# Patient Record
Sex: FEMALE | Race: WHITE | NOT HISPANIC OR LATINO | Employment: FULL TIME | URBAN - METROPOLITAN AREA
[De-identification: names, ages, dates, MRNs, and addresses within clinical notes are randomized per-mention and may not be internally consistent; named-entity substitution may affect disease eponyms.]

---

## 2017-08-07 ENCOUNTER — ALLSCRIPTS OFFICE VISIT (OUTPATIENT)
Dept: OTHER | Facility: OTHER | Age: 22
End: 2017-08-07

## 2017-08-07 DIAGNOSIS — J01.90 ACUTE SINUSITIS: ICD-10-CM

## 2017-08-07 DIAGNOSIS — Z13.220 ENCOUNTER FOR SCREENING FOR LIPOID DISORDERS: ICD-10-CM

## 2017-08-07 DIAGNOSIS — E66.9 OBESITY: ICD-10-CM

## 2017-11-14 ENCOUNTER — ALLSCRIPTS OFFICE VISIT (OUTPATIENT)
Dept: OTHER | Facility: OTHER | Age: 22
End: 2017-11-14

## 2017-11-15 NOTE — PROGRESS NOTES
Assessment    1  Hemorrhoids (455 6) (K64 9)  2  Depression screening (V79 0) (Z13 89)  3  Obese (278 00) (E66 9)    Plan  Depression screening    · *VB - PHQ-9 Tool; Status:Complete;   Done: 76KIQ5895 12:00AM  Hemorrhoids    · Start: Metamucil Smooth Texture 28 3 % Oral Powder; 1 tbsp  in juice  1-2 x a day   · Start: Preparation H 0 25-14-74 9 % Rectal Ointment; as directed  Obese    · Diets that are low in carbohydrates and high in protein are very popular for weight loss  ;Status:Complete;   Done: 43VGV8283 10:58AM   · Keep a diary of when and what you eat ; Status:Complete;   Done: 94CHG3507 10:58AM   · Some eating tips that can help you lose weight ; Status:Complete;   Done: 04Plu534706:58AM   · We encourage all of our patients to exercise regularly  30 minutes of exercise or physicalactivity five or more days a week is recommended for children and adults  ;Status:Complete;   Done: 03UHJ0047 10:58AM   · We recommend that you bring your body mass index down to 26 ; Status:Complete;  Done: 93QBI1046 10:58AM    Discussion/Summary    RTO if continues or worse  The patient was counseled regarding instructions for management,-- risk factor reductions,-- prognosis,-- patient and family education,-- impressions,-- risks and benefits of treatment options,-- importance of compliance with treatment  Possible side effects of new medications were reviewed with the patient/guardian today  Chief Complaint  Pt exp blood in stool x 1 wk      History of Present Illness  HPI: pt has had intermittent blood in the bowl  NO pain and BM noremal   Hemorrhoid (Brief): The patient is being seen for an initial evaluation of hemorrhoids  Symptoms:  rectal bleeding, but-- no anal itching,-- no anal pain,-- no pain with defecation,-- no anal lump,-- no anal leakage,-- no constipation-- and-- no diarrhea  The patient is not currently being treated for this problem   Risk factors:  no current pregnancy,-- no recent pregnancy,-- no chronic constipation-- and-- no chronic diarrhea  Family history:  no colorectal cancer,-- no ulcerative colitis-- and-- no Crohn's disease  This problem has not been previously evaluated  Obesity (Follow-Up): The patient is being seen for follow-up of obesity  The patient reports doing poorly and a weight gain of 55lbs/ 4 years pounds  Interval symptoms:  worsened poor eating habits-- and-- stable depressed mood  The patient is not currently on medication for this problem  Disease management:  the patient is not doing well with her goals  Diet plan: low calorie diet, limited portion sizes, limited junk food and limited high sugar drinks  Due for: diabetes screening and lipid profile  Review of Systems   Constitutional: as noted in HPI  Gastrointestinal: no complaints of abdominal pain, no constipation, no nausea or diarrhea, no vomiting, no bloody stools-- and-- as noted in HPI  Musculoskeletal: no complaints of arthralgia, no myalgia, no joint swelling or stiffness, no limb pain or swelling  Integumentary: no complaints of skin rash or lesion, no itching or dry skin, no skin wounds  Active Problems  1  Birth control (V25 9) (Z30 9)  2  Obese (278 00) (E66 9)  3  Screening for lipoid disorders (V77 91) (Z13 220)    Past Medical History  Active Problems And Past Medical History Reviewed: The active problems and past medical history were reviewed and updated today  Social History     · Never A Smoker   · Never Drank Alcohol  The social history was reviewed and updated today  The social history was reviewed and is unchanged  Current Meds  1  Apri 0 15-30 MG-MCG Oral Tablet; Therapy: [id number] to Recorded    The medication list was reviewed and updated today  Allergies  1   Sulfa Drugs    Vitals   Recorded: 33EYP2558 10:32AM   Temperature 98 1 F, Tympanic   Heart Rate 84   Systolic 753, LUE, Sitting   Diastolic 70, LUE, Sitting   Height 5 ft 6 in   Weight 212 lb 6 oz   BMI Calculated 34 28   BSA Calculated 2 05       Physical Exam   Constitutional  General appearance: No acute distress, well appearing and well nourished  obese  Eyes  Conjunctiva and lids: No swelling, erythema or discharge  Pupils and irises: Equal, round and reactive to light  Pulmonary  Respiratory effort: No increased work of breathing or signs of respiratory distress  Cardiovascular  Auscultation of heart: Normal rate and rhythm, normal S1 and S2, without murmurs  Abdomen  Abdomen: Non-tender, no masses  -- small external hemerrhoid, tight sphincter/ heme neg  Musculoskeletal  Gait and station: Normal    Skin  Skin and subcutaneous tissue: Normal without rashes or lesions  Neurologic  Cranial nerves: Cranial nerves 2-12 intact     Psychiatric  Orientation to person, place, and time: Normal    Mood and affect: Normal          Signatures   Electronically signed by : KECIA Zazueta ; Nov 14 2017 11:00AM EST                       (Author)

## 2017-12-11 ENCOUNTER — ALLSCRIPTS OFFICE VISIT (OUTPATIENT)
Dept: OTHER | Facility: OTHER | Age: 22
End: 2017-12-11

## 2017-12-11 LAB
BILIRUB UR QL STRIP: NORMAL
CLARITY UR: NORMAL
COLOR UR: YELLOW
GLUCOSE (HISTORICAL): NORMAL
HGB UR QL STRIP.AUTO: NORMAL
KETONES UR STRIP-MCNC: NORMAL MG/DL
LEUKOCYTE ESTERASE UR QL STRIP: 500
NITRITE UR QL STRIP: NORMAL
PH UR STRIP.AUTO: 6.5 [PH]
PROT UR STRIP-MCNC: 30 MG/DL
SP GR UR STRIP.AUTO: 1.01
UROBILINOGEN UR QL STRIP.AUTO: 0

## 2018-01-13 VITALS
HEART RATE: 92 BPM | TEMPERATURE: 99.8 F | SYSTOLIC BLOOD PRESSURE: 116 MMHG | OXYGEN SATURATION: 97 % | HEIGHT: 66 IN | BODY MASS INDEX: 33.11 KG/M2 | WEIGHT: 206 LBS | DIASTOLIC BLOOD PRESSURE: 70 MMHG

## 2018-01-13 VITALS
SYSTOLIC BLOOD PRESSURE: 118 MMHG | BODY MASS INDEX: 34.13 KG/M2 | HEART RATE: 84 BPM | TEMPERATURE: 98.1 F | DIASTOLIC BLOOD PRESSURE: 70 MMHG | WEIGHT: 212.38 LBS | HEIGHT: 66 IN

## 2018-01-23 VITALS
WEIGHT: 214 LBS | BODY MASS INDEX: 34.39 KG/M2 | HEART RATE: 76 BPM | HEIGHT: 66 IN | DIASTOLIC BLOOD PRESSURE: 68 MMHG | TEMPERATURE: 98.8 F | SYSTOLIC BLOOD PRESSURE: 110 MMHG

## 2018-07-01 ENCOUNTER — APPOINTMENT (EMERGENCY)
Dept: RADIOLOGY | Facility: HOSPITAL | Age: 23
End: 2018-07-01
Payer: COMMERCIAL

## 2018-07-01 ENCOUNTER — HOSPITAL ENCOUNTER (EMERGENCY)
Facility: HOSPITAL | Age: 23
Discharge: HOME | End: 2018-07-01
Attending: EMERGENCY MEDICINE
Payer: COMMERCIAL

## 2018-07-01 VITALS
TEMPERATURE: 98.3 F | HEART RATE: 101 BPM | RESPIRATION RATE: 20 BRPM | SYSTOLIC BLOOD PRESSURE: 107 MMHG | DIASTOLIC BLOOD PRESSURE: 53 MMHG | OXYGEN SATURATION: 99 %

## 2018-07-01 DIAGNOSIS — R10.31 RIGHT LOWER QUADRANT PAIN: Primary | ICD-10-CM

## 2018-07-01 LAB
ALBUMIN SERPL BCP-MCNC: 3.7 G/DL (ref 3.5–5)
ALP SERPL-CCNC: 75 U/L (ref 46–116)
ALT SERPL W P-5'-P-CCNC: 18 U/L (ref 12–78)
ANION GAP SERPL CALCULATED.3IONS-SCNC: 7 MMOL/L (ref 4–13)
AST SERPL W P-5'-P-CCNC: 14 U/L (ref 5–45)
BACTERIA UR QL AUTO: ABNORMAL /HPF
BASOPHILS # BLD AUTO: 0.04 THOUSANDS/ΜL (ref 0–0.1)
BASOPHILS NFR BLD AUTO: 1 % (ref 0–1)
BILIRUB SERPL-MCNC: 0.25 MG/DL (ref 0.2–1)
BILIRUB UR QL STRIP: NEGATIVE
BUN SERPL-MCNC: 9 MG/DL (ref 5–25)
CALCIUM SERPL-MCNC: 8.5 MG/DL (ref 8.3–10.1)
CHLORIDE SERPL-SCNC: 109 MMOL/L (ref 100–108)
CLARITY UR: CLEAR
CO2 SERPL-SCNC: 24 MMOL/L (ref 21–32)
COLOR UR: YELLOW
COLOR, POC: NORMAL
CREAT SERPL-MCNC: 0.79 MG/DL (ref 0.6–1.3)
EOSINOPHIL # BLD AUTO: 0.23 THOUSAND/ΜL (ref 0–0.61)
EOSINOPHIL NFR BLD AUTO: 3 % (ref 0–6)
ERYTHROCYTE [DISTWIDTH] IN BLOOD BY AUTOMATED COUNT: 12.2 % (ref 11.6–15.1)
EXT PREG TEST URINE: NEGATIVE
GFR SERPL CREATININE-BSD FRML MDRD: 105 ML/MIN/1.73SQ M
GLUCOSE SERPL-MCNC: 104 MG/DL (ref 65–140)
GLUCOSE UR STRIP-MCNC: NEGATIVE MG/DL
HCT VFR BLD AUTO: 37.1 % (ref 34.8–46.1)
HGB BLD-MCNC: 12.5 G/DL (ref 11.5–15.4)
HGB UR QL STRIP.AUTO: ABNORMAL
HYALINE CASTS #/AREA URNS LPF: ABNORMAL /LPF
IMM GRANULOCYTES # BLD AUTO: 0.02 THOUSAND/UL (ref 0–0.2)
IMM GRANULOCYTES NFR BLD AUTO: 0 % (ref 0–2)
KETONES UR STRIP-MCNC: NEGATIVE MG/DL
LEUKOCYTE ESTERASE UR QL STRIP: ABNORMAL
LIPASE SERPL-CCNC: 167 U/L (ref 73–393)
LYMPHOCYTES # BLD AUTO: 3.01 THOUSANDS/ΜL (ref 0.6–4.47)
LYMPHOCYTES NFR BLD AUTO: 36 % (ref 14–44)
MCH RBC QN AUTO: 30 PG (ref 26.8–34.3)
MCHC RBC AUTO-ENTMCNC: 33.7 G/DL (ref 31.4–37.4)
MCV RBC AUTO: 89 FL (ref 82–98)
MONOCYTES # BLD AUTO: 0.54 THOUSAND/ΜL (ref 0.17–1.22)
MONOCYTES NFR BLD AUTO: 7 % (ref 4–12)
NEUTROPHILS # BLD AUTO: 4.42 THOUSANDS/ΜL (ref 1.85–7.62)
NEUTS SEG NFR BLD AUTO: 53 % (ref 43–75)
NITRITE UR QL STRIP: NEGATIVE
NON-SQ EPI CELLS URNS QL MICRO: ABNORMAL /HPF
NRBC BLD AUTO-RTO: 0 /100 WBCS
PH UR STRIP.AUTO: 6.5 [PH] (ref 4.5–8)
PLATELET # BLD AUTO: 233 THOUSANDS/UL (ref 149–390)
PMV BLD AUTO: 11 FL (ref 8.9–12.7)
POTASSIUM SERPL-SCNC: 3.5 MMOL/L (ref 3.5–5.3)
PROT SERPL-MCNC: 7.6 G/DL (ref 6.4–8.2)
PROT UR STRIP-MCNC: NEGATIVE MG/DL
RBC # BLD AUTO: 4.16 MILLION/UL (ref 3.81–5.12)
RBC #/AREA URNS AUTO: ABNORMAL /HPF
SODIUM SERPL-SCNC: 140 MMOL/L (ref 136–145)
SP GR UR STRIP.AUTO: 1.02 (ref 1–1.03)
UROBILINOGEN UR QL STRIP.AUTO: 0.2 E.U./DL
WBC # BLD AUTO: 8.26 THOUSAND/UL (ref 4.31–10.16)
WBC #/AREA URNS AUTO: ABNORMAL /HPF

## 2018-07-01 PROCEDURE — 76830 TRANSVAGINAL US NON-OB: CPT

## 2018-07-01 PROCEDURE — 85025 COMPLETE CBC W/AUTO DIFF WBC: CPT | Performed by: EMERGENCY MEDICINE

## 2018-07-01 PROCEDURE — 99284 EMERGENCY DEPT VISIT MOD MDM: CPT

## 2018-07-01 PROCEDURE — 76856 US EXAM PELVIC COMPLETE: CPT

## 2018-07-01 PROCEDURE — 80053 COMPREHEN METABOLIC PANEL: CPT | Performed by: EMERGENCY MEDICINE

## 2018-07-01 PROCEDURE — 87591 N.GONORRHOEAE DNA AMP PROB: CPT | Performed by: EMERGENCY MEDICINE

## 2018-07-01 PROCEDURE — 74177 CT ABD & PELVIS W/CONTRAST: CPT

## 2018-07-01 PROCEDURE — 96374 THER/PROPH/DIAG INJ IV PUSH: CPT

## 2018-07-01 PROCEDURE — 81001 URINALYSIS AUTO W/SCOPE: CPT

## 2018-07-01 PROCEDURE — 96361 HYDRATE IV INFUSION ADD-ON: CPT

## 2018-07-01 PROCEDURE — 81025 URINE PREGNANCY TEST: CPT | Performed by: EMERGENCY MEDICINE

## 2018-07-01 PROCEDURE — 36415 COLL VENOUS BLD VENIPUNCTURE: CPT

## 2018-07-01 PROCEDURE — 87491 CHLMYD TRACH DNA AMP PROBE: CPT | Performed by: EMERGENCY MEDICINE

## 2018-07-01 PROCEDURE — 83690 ASSAY OF LIPASE: CPT | Performed by: EMERGENCY MEDICINE

## 2018-07-01 RX ORDER — NORETHINDRONE ACETATE AND ETHINYL ESTRADIOL 1; 5 MG/1; UG/1
TABLET ORAL DAILY
COMMUNITY
End: 2018-07-01

## 2018-07-01 RX ORDER — DESOGESTREL AND ETHINYL ESTRADIOL 0.15-0.03
1 KIT ORAL DAILY
COMMUNITY
Start: 2018-05-17 | End: 2019-03-18

## 2018-07-01 RX ORDER — KETOROLAC TROMETHAMINE 30 MG/ML
15 INJECTION, SOLUTION INTRAMUSCULAR; INTRAVENOUS ONCE
Status: COMPLETED | OUTPATIENT
Start: 2018-07-01 | End: 2018-07-01

## 2018-07-01 RX ADMIN — SODIUM CHLORIDE 1000 ML: 0.9 INJECTION, SOLUTION INTRAVENOUS at 00:47

## 2018-07-01 RX ADMIN — KETOROLAC TROMETHAMINE 15 MG: 30 INJECTION, SOLUTION INTRAMUSCULAR at 00:55

## 2018-07-01 RX ADMIN — IOHEXOL 100 ML: 350 INJECTION, SOLUTION INTRAVENOUS at 01:49

## 2018-07-01 NOTE — DISCHARGE INSTRUCTIONS
Abdominal Pain   WHAT YOU NEED TO KNOW:   Abdominal pain can be dull, achy, or sharp  You may have pain in one area of your abdomen, or in your entire abdomen  Your pain may be caused by a condition such as constipation, food sensitivity or poisoning, infection, or a blockage  Abdominal pain can also be from a hernia, appendicitis, or an ulcer  Liver, gallbladder, or kidney conditions can also cause abdominal pain  The cause of your abdominal pain may be unknown  DISCHARGE INSTRUCTIONS:   Return to the emergency department if:   · You have new chest pain or shortness of breath  · You have pulsing pain in your upper abdomen or lower back that suddenly becomes constant  · Your pain is in the right lower abdominal area and worsens with movement  · You have a fever over 100 4°F (38°C) or shaking chills  · You are vomiting and cannot keep food or liquids down  · Your pain does not improve or gets worse over the next 8 to 12 hours  · You see blood in your vomit or bowel movements, or they look black and tarry  · Your skin or the whites of your eyes turn yellow  · You are a woman and have a large amount of vaginal bleeding that is not your monthly period  Contact your healthcare provider if:   · You have pain in your lower back  · You are a man and have pain in your testicles  · You have pain when you urinate  · You have questions or concerns about your condition or care  Follow up with your healthcare provider within 24 hours or as directed:  Write down your questions so you remember to ask them during your visits  Medicines:   · Medicines  may be given to calm your stomach and prevent vomiting or to decrease pain  Ask how to take pain medicine safely  · Take your medicine as directed  Contact your healthcare provider if you think your medicine is not helping or if you have side effects  Tell him of her if you are allergic to any medicine   Keep a list of the medicines, vitamins, and herbs you take  Include the amounts, and when and why you take them  Bring the list or the pill bottles to follow-up visits  Carry your medicine list with you in case of an emergency  © 2017 [address] [employer] Information is for End User's use only and may not be sold, redistributed or otherwise used for commercial purposes  All illustrations and images included in CareNotes® are the copyrighted property of A D A M , Inc  or Jeff Yanes  The above information is an  only  It is not intended as medical advice for individual conditions or treatments  Talk to your doctor, nurse or pharmacist before following any medical regimen to see if it is safe and effective for you

## 2018-07-01 NOTE — ED ATTENDING ATTESTATION
Kimberly Marc MD, saw and evaluated the patient  I have discussed the patient with the resident/non-physician practitioner and agree with the resident's/non-physician practitioner's findings, Plan of Care, and MDM as documented in the resident's/non-physician practitioner's note, except where noted  All available labs and Radiology studies were reviewed  At this point I agree with the current assessment done in the Emergency Department  I have conducted an independent evaluation of this patient a history and physical is as follows:    OA: 23 y/o f c/o abdominal pain  Pain is in RLQ, initially a dull ache, currently sharp and constant  Pain worsens with movement  Tolerating PO  No n/v  No urinary sxms  No vaginal discharge/bleeding  No fevers/chills  No previous abd surgeries  No cp/sob  NO rash  PE, well developed f resting comfortably with boyfriend at bedside  VSS, NC/AT, MMM, anicteric, neck supple/fROM, RR, lungs CTAB, abd soft, +BS, + mild ttp over RLQ, -r/g, intact distal pulses and capillary refill < 2 sec, AAOx3  A/p abd pain, RLQ, labs, imaging with CT with possible ultrasound pending CT results, pelvic exam per resident  Pt workup and disposition pending at end of shift       Critical Care Time  CritCare Time    Procedures

## 2018-07-01 NOTE — ED PROVIDER NOTES
History  Chief Complaint   Patient presents with    Abdominal Pain     c/o LRQ abdominal pain since 6/28 at 1500  Pt  states pain was bearable until this evening, which she now rates at 6/10  Denies all other symptoms  26-year-old female presents to the emergency room with right lower quadrant abdominal pain x3 days  Patient states that patient initially was a dull achy pain that had been constant since onset  She states that this evening, about 1 hour ago, the pain has now worsened and became sharp in nature  She states that movement of her right leg worsens the pain and nothing improves it  She has not tried anything for the symptoms  Denies any fevers/chills, nausea/vomiting, urinary symptoms, diarrhea/constipation, or vaginal complaints  She states that her 1st day of her last menstrual period was about 2 weeks ago  She denies any abdominal surgeries in the past   No other complaints  History provided by:  Patient  Abdominal Pain   Pain location:  RLQ  Pain quality: aching and sharp    Pain radiates to:  Does not radiate  Pain severity:  Moderate  Onset quality:  Gradual  Duration:  4 hours  Timing:  Constant  Progression:  Worsening  Chronicity:  New  Context: not eating, not previous surgeries and not sick contacts    Relieved by:  None tried  Worsened by:  Coughing and movement  Ineffective treatments:  None tried  Associated symptoms: no chest pain, no chills, no cough, no diarrhea, no dysuria, no fever, no hematuria, no nausea, no shortness of breath, no sore throat, no vaginal bleeding, no vaginal discharge and no vomiting    Risk factors: has not had multiple surgeries        Prior to Admission Medications   Prescriptions Last Dose Informant Patient Reported?  Taking?   desogestrel-ethinyl estradiol (APRI) 0 15-30 MG-MCG per tablet 6/30/2018 at Unknown time  Yes Yes   Sig: Take 1 tablet by mouth daily      Facility-Administered Medications: None       Past Medical History:   Diagnosis Date    ECM (erythema chronicum migrans) 06/17/2015    Per [employer]       Past Surgical History:   Procedure Laterality Date    TOOTH EXTRACTION         History reviewed  No pertinent family history  I have reviewed and agree with the history as documented  Social History   Substance Use Topics    Smoking status: Never Smoker    Smokeless tobacco: Never Used    Alcohol use Yes        Review of Systems   Constitutional: Negative for chills and fever  HENT: Negative for congestion, ear pain and sore throat  Eyes: Negative for pain and visual disturbance  Respiratory: Negative for cough, shortness of breath and wheezing  Cardiovascular: Negative for chest pain and leg swelling  Gastrointestinal: Positive for abdominal pain  Negative for diarrhea, nausea and vomiting  Genitourinary: Negative for dysuria, frequency, hematuria, urgency, vaginal bleeding and vaginal discharge  Musculoskeletal: Negative for neck pain and neck stiffness  Skin: Negative for rash and wound  Neurological: Negative for weakness, numbness and headaches  Psychiatric/Behavioral: Negative for agitation and confusion  All other systems reviewed and are negative  Physical Exam  ED Triage Vitals [06/30/18 2340]   Temperature Pulse Respirations Blood Pressure SpO2   98 3 °F (36 8 °C) (!) 107 18 142/98 99 %      Temp src Heart Rate Source Patient Position - Orthostatic VS BP Location FiO2 (%)   -- Monitor Sitting Left arm --      Pain Score       6           Orthostatic Vital Signs  Vitals:    06/30/18 2340 07/01/18 0329   BP: 142/98 107/53   Pulse: (!) 107 101   Patient Position - Orthostatic VS: Sitting Lying       Physical Exam   Constitutional: She is oriented to person, place, and time  She appears well-developed and well-nourished  HENT:   Head: Normocephalic and atraumatic  Mouth/Throat: Oropharynx is clear and moist    Eyes: EOM are normal  Pupils are equal, round, and reactive to light     Neck: Normal range of motion  Neck supple  Cardiovascular: Normal rate and regular rhythm  Pulmonary/Chest: Effort normal and breath sounds normal  No respiratory distress  She has no wheezes  She has no rales  Abdominal: Soft  Bowel sounds are normal  She exhibits no distension  There is tenderness in the right lower quadrant  There is tenderness at McBurney's point  There is no rigidity, no rebound, no guarding, no CVA tenderness and negative Wang's sign  No peritoneal signs    Genitourinary: Cervix exhibits no motion tenderness  Right adnexum displays no tenderness  Left adnexum displays no tenderness  No tenderness or bleeding in the vagina  Vaginal discharge found  Genitourinary Comments: White vaginal discharge   Musculoskeletal: Normal range of motion  Neurological: She is alert and oriented to person, place, and time  No focal deficits   Skin: Skin is warm and dry  Nursing note and vitals reviewed  ED Medications  Medications   sodium chloride 0 9 % bolus 1,000 mL (0 mL Intravenous Stopped 7/1/18 0319)   ketorolac (TORADOL) injection 15 mg (15 mg Intravenous Given 7/1/18 0055)   iohexol (OMNIPAQUE) 350 MG/ML injection (MULTI-DOSE) 100 mL (100 mL Intravenous Given 7/1/18 0149)       Diagnostic Studies  Results Reviewed     Procedure Component Value Units Date/Time    Chlamydia/GC amplified DNA by PCR [[id number]] Collected:  07/01/18 0235    Lab Status:   In process Specimen:  Genital from Cervix Updated:  07/01/18 0254    Comprehensive metabolic panel [[id number]]  (Abnormal) Collected:  07/01/18 0046    Lab Status:  Final result Specimen:  Blood from Arm, Left Updated:  07/01/18 0116     Sodium 140 mmol/L      Potassium 3 5 mmol/L      Chloride 109 (H) mmol/L      CO2 24 mmol/L      Anion Gap 7 mmol/L      BUN 9 mg/dL      Creatinine 0 79 mg/dL      Glucose 104 mg/dL      Calcium 8 5 mg/dL      AST 14 U/L      ALT 18 U/L      Alkaline Phosphatase 75 U/L      Total Protein 7 6 g/dL      Albumin 3 7 g/dL      Total Bilirubin 0 25 mg/dL      eGFR 105 ml/min/1 73sq m     Narrative:         National Kidney Disease Education Program recommendations are as follows:  GFR calculation is accurate only with a steady state creatinine  Chronic Kidney disease less than 60 ml/min/1 73 sq  meters  Kidney failure less than 15 ml/min/1 73 sq  meters      Lipase [[id number]]  (Normal) Collected:  07/01/18 0046    Lab Status:  Final result Specimen:  Blood from Arm, Left Updated:  07/01/18 0116     Lipase 167 u/L     Urine Microscopic [[id number]]  (Abnormal) Collected:  07/01/18 0100    Lab Status:  Final result Specimen:  Urine from Urine, Clean Catch Updated:  07/01/18 0114     RBC, UA 4-10 (A) /hpf      WBC, UA 4-10 (A) /hpf      Epithelial Cells None Seen /hpf      Bacteria, UA Occasional /hpf      Hyaline Casts, UA None Seen /lpf     CBC and differential [[id number]] Collected:  07/01/18 0046    Lab Status:  Final result Specimen:  Blood from Arm, Left Updated:  07/01/18 0058     WBC 8 26 Thousand/uL      RBC 4 16 Million/uL      Hemoglobin 12 5 g/dL      Hematocrit 37 1 %      MCV 89 fL      MCH 30 0 pg      MCHC 33 7 g/dL      RDW 12 2 %      MPV 11 0 fL      Platelets 882 Thousands/uL      nRBC 0 /100 WBCs      Neutrophils Relative 53 %      Immat GRANS % 0 %      Lymphocytes Relative 36 %      Monocytes Relative 7 %      Eosinophils Relative 3 %      Basophils Relative 1 %      Neutrophils Absolute 4 42 Thousands/µL      Immature Grans Absolute 0 02 Thousand/uL      Lymphocytes Absolute 3 01 Thousands/µL      Monocytes Absolute 0 54 Thousand/µL      Eosinophils Absolute 0 23 Thousand/µL      Basophils Absolute 0 04 Thousands/µL     POCT urinalysis dipstick [[id number]]  (Normal) Resulted:  07/01/18 0056    Lab Status:  Final result Specimen:  Urine Updated:  07/01/18 0056     Color, UA see results    POCT pregnancy, urine [[id number]]  (Normal) Resulted:  07/01/18 0056    Lab Status:  Final result Updated:  07/01/18 0056     EXT PREG TEST UR (Ref: Negative) negative    ED Urine Macroscopic [[id number]]  (Abnormal) Collected:  07/01/18 0100    Lab Status:  Final result Specimen:  Urine Updated:  07/01/18 0052     Color, UA Yellow     Clarity, UA Clear     pH, UA 6 5     Leukocytes, UA Trace (A)     Nitrite, UA Negative     Protein, UA Negative mg/dl      Glucose, UA Negative mg/dl      Ketones, UA Negative mg/dl      Urobilinogen, UA 0 2 E U /dl      Bilirubin, UA Negative     Blood, UA Trace (A)     Specific [place], UA 1 025    Narrative:       CLINITEK RESULT                 US pelvis complete w transvaginal   Final Result by Isamar Mcdonough MD (07/01 0435)       Normal                       Workstation performed: [id number]         CT abdomen pelvis w contrast   Final Result by Isamar Mcdonough MD (07/01 0243)      Small to moderate hiatal hernia  Normal appendix  No acute pathology visualized on CT of the abdomen and pelvis with IV and without oral contrast             Workstation performed: [id number]               Procedures  Procedures      Phone Consults  ED Phone Contact    ED Course  ED Course as of Jul 02 2047   Coolidge Gosselin Jul 01, 2018   [facility] paged- will come in for exam     [id number] Case signed out to Dr Solitario Rodriguez- pending [address] [facility]  MDM  Number of Diagnoses or Management Options  Right lower quadrant pain: new and requires workup  Diagnosis management comments: Patient with rlq abd pain x 3 days, worse tonight  Will get labs, ua, urine preg, and ct abd/pel to r/o intra-abdominal pathology  Case signed out to Dr Solitario Rodriguez, pending pelvic US and dispo          Amount and/or Complexity of Data Reviewed  Clinical lab tests: ordered and reviewed  Tests in the radiology section of CPT®: ordered and reviewed  Tests in the medicine section of CPT®: ordered and reviewed  Discussion of test results with the performing providers: yes  Decide to obtain previous medical records or to obtain history from someone other than the patient: yes  Obtain history from someone other than the patient: yes  Review and summarize past medical records: yes  Discuss the patient with other providers: yes  Independent visualization of images, tracings, or specimens: yes    Patient Progress  Patient progress: improved    CritCare Time    Disposition  Final diagnoses:   Right lower quadrant pain     Time reflects when diagnosis was documented in both MDM as applicable and the Disposition within this note     Time User Action Codes Description Comment    7/1/2018  3:13 AM Gabrielle Harry Add [R10 31] Right lower quadrant pain       ED Disposition     ED Disposition Condition Comment    Discharge  WM Valdez discharge to home/self care  Condition at discharge: Stable        Follow-up Information     Follow up With Specialties Details Why [address] [facility], MD Internal Medicine Call in 1 day for follow up within 2-3 days  [address] [place] [address] Emergency Department Emergency Medicine Go to immediately for any new or worsening symptoms  [address]  [phone]  ED, [address] [facility], [place], [address] [facility], [address]          Discharge Medication List as of 7/1/2018  4:38 AM      CONTINUE these medications which have NOT CHANGED    Details   desogestrel-ethinyl estradiol (APRI) 0 15-30 MG-MCG per tablet Take 1 tablet by mouth daily, Starting Thu 5/17/2018, Historical Med           No discharge procedures on file  ED Provider  Attending physically available and evaluated MW Valdez  I managed the patient along with the ED Attending      Electronically Signed by         Corey Macias DO  07/02/18 0083

## 2018-07-02 ENCOUNTER — OFFICE VISIT (OUTPATIENT)
Dept: INTERNAL MEDICINE CLINIC | Age: 23
End: 2018-07-02
Payer: COMMERCIAL

## 2018-07-02 VITALS
HEART RATE: 80 BPM | DIASTOLIC BLOOD PRESSURE: 78 MMHG | BODY MASS INDEX: 35.36 KG/M2 | TEMPERATURE: 99 F | SYSTOLIC BLOOD PRESSURE: 106 MMHG | WEIGHT: 220 LBS | OXYGEN SATURATION: 96 % | HEIGHT: 66 IN

## 2018-07-02 DIAGNOSIS — N39.0 URINARY TRACT INFECTION WITHOUT HEMATURIA, SITE UNSPECIFIED: ICD-10-CM

## 2018-07-02 DIAGNOSIS — R10.31 RIGHT LOWER QUADRANT ABDOMINAL PAIN: Primary | ICD-10-CM

## 2018-07-02 PROBLEM — E66.9 OBESE: Status: ACTIVE | Noted: 2017-08-07

## 2018-07-02 PROBLEM — K64.9 HEMORRHOIDS: Status: ACTIVE | Noted: 2017-11-14

## 2018-07-02 LAB
CHLAMYDIA DNA CVX QL NAA+PROBE: NORMAL
N GONORRHOEA DNA GENITAL QL NAA+PROBE: NORMAL

## 2018-07-02 PROCEDURE — 99213 OFFICE O/P EST LOW 20 MIN: CPT | Performed by: INTERNAL MEDICINE

## 2018-07-02 RX ORDER — CIPROFLOXACIN 500 MG/1
500 TABLET, FILM COATED ORAL EVERY 12 HOURS SCHEDULED
Qty: 14 TABLET | Refills: 0 | Status: SHIPPED | OUTPATIENT
Start: 2018-07-02 | End: 2018-07-09

## 2018-07-02 NOTE — PATIENT INSTRUCTIONS
Abdominal Pain   AMBULATORY CARE:   Abdominal pain  can be dull, achy, or sharp  You may have pain in one area of your abdomen, or in your entire abdomen  Your pain may be caused by a condition such as constipation, food sensitivity or poisoning, infection, or a blockage  Abdominal pain can also be from a hernia, appendicitis, or an ulcer  Liver, gallbladder, or kidney conditions can also cause abdominal pain  The cause of your abdominal pain may be unknown  Seek care immediately if:   · You have new chest pain or shortness of breath  · You have pulsing pain in your upper abdomen or lower back that suddenly becomes constant  · Your pain is in the right lower abdominal area and worsens with movement  · You have a fever over 100 4°F (38°C) or shaking chills  · You are vomiting and cannot keep food or liquids down  · Your pain does not improve or gets worse over the next 8 to 12 hours  · You see blood in your vomit or bowel movements, or they look black and tarry  · Your skin or the whites of your eyes turn yellow  · You are a woman and have a large amount of vaginal bleeding that is not your monthly period  Contact your healthcare provider if:   · You have pain in your lower back  · You are a man and have pain in your testicles  · You have pain when you urinate  · You have questions or concerns about your condition or care  Treatment for abdominal pain  may include medicine to calm your stomach, prevent vomiting, or decrease pain  Follow up with your healthcare provider as directed:  Write down your questions so you remember to ask them during your visits  © 2017 [employer] [employer]  Information is for End User's use only and may not be sold, redistributed or otherwise used for commercial purposes  All illustrations and images included in CareNotes® are the copyrighted property of [employer]  or Jeff Yanes    The above information is an educational aid only  It is not intended as medical advice for individual conditions or treatments  Talk to your doctor, nurse or pharmacist before following any medical regimen to see if it is safe and effective for you

## 2018-07-02 NOTE — PROGRESS NOTES
Assessment/Plan:    Right lower quadrant abdominal pain   She was seen in the ER yesterday for right lower quadrant pain  A CT scan and ultrasound of her pelvis were normal   The only abnormality was trace blood and leukocytes in urine  The pain continues and radiates into her groin  There is a family history of renal calculi  She has no dysuria and/or on change in bowel movements  She is midcycle in her  She has no on complaints of fever or chills  Will try treating for Cipro for UTI and encouraged her to drink fluids  If the pain does not improve she will have to have a spiral CT scan looking for stones       Diagnoses and all orders for this visit:    Right lower quadrant abdominal pain    Urinary tract infection without hematuria, site unspecified  -     ciprofloxacin (CIPRO) 500 mg tablet; Take 1 tablet (500 mg total) by mouth every 12 (twelve) hours for 7 days          Subjective:      Patient ID: Christal Lomas is a 24 y o  female  Patient complains of several days of increasing right lower quadrant pain for which she was seen in the ER      Abdominal Pain   Pertinent negatives include no arthralgias, constipation, diarrhea, dysuria, fever, frequency, headaches, hematuria, myalgias or nausea  Review of Systems   Constitutional: Negative  Negative for chills, fatigue, fever and unexpected weight change  HENT: Negative for congestion, ear pain, hearing loss, postnasal drip, sinus pressure, sore throat, trouble swallowing and voice change  Eyes: Negative for visual disturbance  Respiratory: Negative for cough, chest tightness, shortness of breath and wheezing  Cardiovascular: Negative for chest pain, palpitations and leg swelling  Gastrointestinal: Positive for abdominal pain  Negative for abdominal distention, anal bleeding, blood in stool, constipation, diarrhea and nausea  Endocrine: Negative for cold intolerance, polydipsia, polyphagia and polyuria     Genitourinary: Negative  Negative for dysuria, flank pain, frequency, hematuria and urgency  Musculoskeletal: Negative  Negative for arthralgias, back pain, gait problem, joint swelling, myalgias and neck pain  Skin: Negative for rash  Allergic/Immunologic: Negative for immunocompromised state  Neurological: Negative  Negative for dizziness, syncope, facial asymmetry, weakness, light-headedness, numbness and headaches  Hematological: Negative for adenopathy  Psychiatric/Behavioral: Negative  Negative for confusion, sleep disturbance and suicidal ideas  Objective:      /78 (BP Location: Left arm, Patient Position: Sitting)   Pulse 80   Temp 99 °F (37 2 °C) (Tympanic)   Ht 5' 6" (1 676 m)   Wt 99 8 kg (220 lb)   LMP 06/16/2018   SpO2 96%   BMI 35 51 kg/m²          Physical Exam   Constitutional: She is oriented to person, place, and time  She appears well-developed and well-nourished  No distress  HENT:   Right Ear: External ear normal    Left Ear: External ear normal    Nose: Nose normal    Mouth/Throat: Oropharynx is clear and moist  No oropharyngeal exudate  Eyes: EOM are normal  Pupils are equal, round, and reactive to light  Neck: Normal range of motion  Neck supple  No JVD present  No thyromegaly present  Cardiovascular: Normal rate, regular rhythm, normal heart sounds and intact distal pulses  Exam reveals no gallop  No murmur heard  Pulmonary/Chest: Effort normal and breath sounds normal  No respiratory distress  She has no wheezes  She has no rales  Abdominal: Soft  Bowel sounds are normal  She exhibits no distension and no mass  There is no tenderness  Right lower quadrant tenderness   Musculoskeletal: Normal range of motion  She exhibits no tenderness  Lymphadenopathy:     She has no cervical adenopathy  Neurological: She is alert and oriented to person, place, and time  No cranial nerve deficit  Coordination normal    Skin: No rash noted     Psychiatric: She has a normal mood and affect   Her behavior is normal  Judgment and thought content normal

## 2018-07-02 NOTE — ASSESSMENT & PLAN NOTE
She was seen in the ER yesterday for right lower quadrant pain  A CT scan and ultrasound of her pelvis were normal   The only abnormality was trace blood and leukocytes in urine  The pain continues and radiates into her groin  There is a family history of renal calculi  She has no dysuria and/or on change in bowel movements  She is midcycle in her  She has no on complaints of fever or chills  Will try treating for Cipro for UTI and encouraged her to drink fluids    If the pain does not improve she will have to have a spiral CT scan looking for stones

## 2018-07-06 ENCOUNTER — APPOINTMENT (OUTPATIENT)
Dept: URGENT CARE | Age: 23
End: 2018-07-06
Payer: OTHER MISCELLANEOUS

## 2018-07-06 ENCOUNTER — APPOINTMENT (OUTPATIENT)
Dept: RADIOLOGY | Age: 23
End: 2018-07-06
Payer: OTHER MISCELLANEOUS

## 2018-07-06 DIAGNOSIS — S99.911A ANKLE INJURY, RIGHT, INITIAL ENCOUNTER: Primary | ICD-10-CM

## 2018-07-06 DIAGNOSIS — S99.911A ANKLE INJURY, RIGHT, INITIAL ENCOUNTER: ICD-10-CM

## 2018-07-06 PROCEDURE — 73610 X-RAY EXAM OF ANKLE: CPT

## 2018-07-06 PROCEDURE — 99283 EMERGENCY DEPT VISIT LOW MDM: CPT | Performed by: PREVENTIVE MEDICINE

## 2018-07-06 PROCEDURE — G0382 LEV 3 HOSP TYPE B ED VISIT: HCPCS | Performed by: PREVENTIVE MEDICINE

## 2018-07-06 PROCEDURE — 73630 X-RAY EXAM OF FOOT: CPT

## 2018-07-09 ENCOUNTER — APPOINTMENT (OUTPATIENT)
Dept: URGENT CARE | Age: 23
End: 2018-07-09
Payer: OTHER MISCELLANEOUS

## 2018-07-09 PROCEDURE — 99214 OFFICE O/P EST MOD 30 MIN: CPT | Performed by: PREVENTIVE MEDICINE

## 2018-07-13 ENCOUNTER — APPOINTMENT (OUTPATIENT)
Dept: URGENT CARE | Age: 23
End: 2018-07-13

## 2018-07-20 ENCOUNTER — TRANSCRIBE ORDERS (OUTPATIENT)
Dept: ADMINISTRATIVE | Facility: HOSPITAL | Age: 23
End: 2018-07-20

## 2018-07-20 DIAGNOSIS — S93.401D SPRAIN OF RIGHT ANKLE, UNSPECIFIED LIGAMENT, SUBSEQUENT ENCOUNTER: Primary | ICD-10-CM

## 2018-07-23 ENCOUNTER — HOSPITAL ENCOUNTER (OUTPATIENT)
Dept: RADIOLOGY | Age: 23
Discharge: HOME | End: 2018-07-23
Payer: OTHER MISCELLANEOUS

## 2018-07-23 DIAGNOSIS — S93.401D SPRAIN OF RIGHT ANKLE, UNSPECIFIED LIGAMENT, SUBSEQUENT ENCOUNTER: ICD-10-CM

## 2018-07-23 PROCEDURE — 73721 MRI JNT OF LWR EXTRE W/O DYE: CPT

## 2018-07-28 ENCOUNTER — OFFICE VISIT (OUTPATIENT)
Dept: OBGYN CLINIC | Facility: HOSPITAL | Age: 23
End: 2018-07-28
Payer: OTHER MISCELLANEOUS

## 2018-07-28 VITALS
BODY MASS INDEX: 35.36 KG/M2 | HEIGHT: 66 IN | HEART RATE: 93 BPM | DIASTOLIC BLOOD PRESSURE: 84 MMHG | SYSTOLIC BLOOD PRESSURE: 130 MMHG | WEIGHT: 220.02 LBS

## 2018-07-28 DIAGNOSIS — T14.8XXA CONTUSION OF BONE: ICD-10-CM

## 2018-07-28 DIAGNOSIS — S93.401A SPRAIN OF UNSPECIFIED LIGAMENT OF RIGHT ANKLE, INITIAL ENCOUNTER: Primary | ICD-10-CM

## 2018-07-28 PROCEDURE — 99203 OFFICE O/P NEW LOW 30 MIN: CPT | Performed by: PHYSICIAN ASSISTANT

## 2018-07-28 NOTE — PROGRESS NOTES
Assessment/Plan   Diagnoses and all orders for this visit:    Sprain of unspecified ligament of right ankle, initial encounter  -     Ambulatory referral to Physical Therapy; Future    Contusion of bone, cuboid  -     CAM boot fitted and dispensed          Subjective   Patient ID: Christal Lomas is a 24 y o  female  Vitals:    07/28/18 0843   BP: 130/84   Pulse: 80     24yo female comes in for an evaluation of her left ankle  She was originally injured 7/6/18 when she fell on the stairs and inverted it  She had lateral pain and treated it with rest   Her pain has been improving, but since it did not resolve, she had an MRI done  This showed an ATF sprain and a bone contusion of the cuboid  The pain is dull in character, mild in severity, pain does not radiate and is not associated with numbness  She presents with a lace-up brace and a single crutch  The following portions of the patient's history were reviewed and updated as appropriate: allergies, current medications, past family history, past medical history, past social history, past surgical history and problem list     Review of Systems  Ortho Exam  Past Medical History:   Diagnosis Date    ECM (erythema chronicum migrans) 06/17/2015    Per [employer]     Past Surgical History:   Procedure Laterality Date    TOOTH EXTRACTION       History reviewed  No pertinent family history  Social History     Occupational History    Not on file  Social History Main Topics    Smoking status: Never Smoker    Smokeless tobacco: Never Used    Alcohol use Yes    Drug use: No    Sexual activity: Not on file       Review of Systems   Constitutional: Negative  HENT: Negative  Eyes: Negative  Respiratory: Negative  Cardiovascular: Negative  Gastrointestinal: Negative  Endocrine: Negative  Genitourinary: Negative  Musculoskeletal: As below      Allergic/Immunologic: Negative  Neurological: Negative      Hematological: Negative  Psychiatric/Behavioral: Negative  Objective   Physical Exam        I have personally reviewed pertinent films in PACS and my interpretation is normal xray  Bone contusion on MRI         · Constitutional: Awake, Alert, Oriented  · Eyes: EOMI  · Psych: Mood and affect appropriate  · Heart: regular rate and rhythm  · Lungs: No audible wheezing  · Abdomen: soft  · Lymph: no lymphedema      Physical exam  · General: Awake, Alert, Oriented  · Eyes: Pupils equal, round and reactive to light  · Heart: regular rate and rhythm  · Lungs: No audible wheezing  · Abdomen: soft       left Ankle:  - Appearance   No swelling, discoloration, deformity, or ecchymosis  - Palpation   No tenderness about the medial / lateral malleoli, deltoid, proximal fibula, atf, cf, ptf, talus, achilles or 5th MT  - ROM   Full, pain-free, active ROM  - Special Tests   Negative anterior drawer  - Motor   normal 5/5 in all planes  - NVI distally

## 2018-07-28 NOTE — PATIENT INSTRUCTIONS
Call or follow up with any new or worsening symptoms as discussed  CAM boot - wear at all times but may remove for sleeping and showering  Take a daily aspirin while in the boot to prevent blood clots  Ok to discontinue the crutch when able to walk without a limp  Ice Pack Application   WHAT YOU NEED TO KNOW:   Ice can be used to decrease swelling and pain after an injury or surgery  Common injuries that may benefit from ice therapy are sprains, strains, and bruises  The use of ice is most effective in the first 1 to 3 days after an injury  DISCHARGE INSTRUCTIONS:   How to apply ice:   · Fill a bag with crushed ice about half full  Remove the air from the bag before you close it  You can also use a bag of frozen vegetables  · Wrap the ice pack in a cloth to protect your skin from frostbite or other injury  · Put the ice over the injured area for 20 to 30 minutes or as long as directed  · Check your skin after about 30 seconds for color changes or blistering  Remove the ice if you notice skin changes or you feel burning or numbness in the area  · Throw the ice pack away after use  · Apply ice to your injured area 4 times each day or as directed  Ask your healthcare provider how many days you should apply ice  Contact your healthcare provider if:   · You see blisters, whitening of your skin, or a bluish color to your skin after using ice  · You feel burning or numbness when using ice  · You have questions about the use of ice packs  © 2017 [employer]  Information is for End User's use only and may not be sold, redistributed or otherwise used for commercial purposes  All illustrations and images included in CareNotes® are the copyrighted property of [employer] , [employer]  or [employer]  The above information is an  only  It is not intended as medical advice for individual conditions or treatments   Talk to your doctor, nurse or pharmacist before following any medical regimen to see if it is safe and effective for you

## 2018-08-14 ENCOUNTER — OFFICE VISIT (OUTPATIENT)
Dept: OBGYN CLINIC | Facility: OTHER | Age: 23
End: 2018-08-14
Payer: OTHER MISCELLANEOUS

## 2018-08-14 VITALS
WEIGHT: 229 LBS | HEART RATE: 97 BPM | SYSTOLIC BLOOD PRESSURE: 123 MMHG | DIASTOLIC BLOOD PRESSURE: 87 MMHG | HEIGHT: 67 IN | BODY MASS INDEX: 35.94 KG/M2

## 2018-08-14 DIAGNOSIS — S92.214A CLOSED NONDISPLACED FRACTURE OF CUBOID OF RIGHT FOOT, INITIAL ENCOUNTER: ICD-10-CM

## 2018-08-14 DIAGNOSIS — S93.491A TEAR OF TALOFIBULAR LIGAMENT OF RIGHT LOWER EXTREMITY, INITIAL ENCOUNTER: Primary | ICD-10-CM

## 2018-08-14 PROCEDURE — 99214 OFFICE O/P EST MOD 30 MIN: CPT | Performed by: INTERNAL MEDICINE

## 2018-08-14 RX ORDER — DESOGESTREL AND ETHINYL ESTRADIOL 0.15-0.03
KIT ORAL
COMMUNITY
End: 2019-06-04

## 2018-08-14 NOTE — PROGRESS NOTES
Assessment/Plan:  Assessment/Plan   Diagnoses and all orders for this visit:    Tear of talofibular ligament of right lower extremity, initial encounter  -     Post Op Shoe  -     Elastic Bandages & Supports (AIRCAST SPORT ANKLE BRACE/LEFT) MISC; by Does not apply route continuous    Closed nondisplaced fracture of cuboid of right foot, initial encounter  -     Post Op Shoe  -     Elastic Bandages & Supports (AIRCAST SPORT ANKLE BRACE/LEFT) MISC; by Does not apply route continuous    Other orders  -     desogestrel-ethinyl estradiol (APRI) 0 15-30 MG-MCG per tablet; Take 1 tablet every day by oral route  Patient's physical examination is still significant for ATFL tenderness and a residual cuboid tenderness  I have transitioned her out of the Cam boot into a postop shoe and sport Aircast   She should not drive while wearing the postop shoe  However, she can drive without a postop shoe on  Continue physical therapy rehabilitation  Follow-up for re-evaluation 1 month  I have cleared her to resume work with appropriate work restrictions  Patient was advised to call and return to the clinic sooner or go to the closest emergency room if he develops any symptom exacerbation  This document was recorded using voice recognition software and errors may be noted  Subjective:   Patient ID: Fatoumata Baig is a 24 y o  female  HPI    Ms Geoffrey horton is a pleasant 25-year-old female presents for initial evaluation of a right ankle injury which she sustained status post fall from 3 steps at work on 7/6/2018  She was seen at [address] [place] [address] ([facility]) on the same day of  Her injury  She subsequently saw Trey Dupree under the supervision of Dr Weston Ch on 7/28/2018 who placed her in a Cam boot referred her to my service for further evaluation and management  On today's presentation, she reports that the sharp pain has subsided    However, she is still having some deep achy pain  She has been doing physical therapy rehabilitation  She denies any numbness or tingling  The following portions of the patient's history were reviewed and updated as appropriate: allergies, current medications, past family history, past medical history, past social history, past surgical history and problem list     Review of Systems    Objective:  Right Ankle Exam   Swelling: mild    Tenderness   The patient is experiencing tenderness in the ATF (Residual cuboid tenderness noted  )  Range of Motion   The patient has normal right ankle ROM  Muscle Strength   Right ankle normal muscle strength: Patient is able to flex and extend her right ankle against gravity  Other   Erythema: absent  Sensation: normal  Pulse: present           Strength/Myotome Testing     Right Ankle/Foot   Right ankle normal muscle strength: Patient is able to flex and extend her right ankle against gravity  Physical Exam    I have personally reviewed pertinent films in PACS and my interpretation is Right ankle MRI done on 7/23/2018 is significant for a complete ATFL tear, exuberant cuboid bone marrow edema with a subtle fracture lineAnd a small effusion  Otherwise, no other pathology noted  Valentina Anthony

## 2018-09-11 ENCOUNTER — OFFICE VISIT (OUTPATIENT)
Dept: OBGYN CLINIC | Facility: OTHER | Age: 23
End: 2018-09-11
Payer: OTHER MISCELLANEOUS

## 2018-09-11 VITALS
WEIGHT: 228.6 LBS | HEART RATE: 88 BPM | BODY MASS INDEX: 35.8 KG/M2 | DIASTOLIC BLOOD PRESSURE: 83 MMHG | SYSTOLIC BLOOD PRESSURE: 120 MMHG

## 2018-09-11 DIAGNOSIS — S92.214D CLOSED NONDISPLACED FRACTURE OF CUBOID OF RIGHT FOOT WITH ROUTINE HEALING, SUBSEQUENT ENCOUNTER: Primary | ICD-10-CM

## 2018-09-11 DIAGNOSIS — S93.491D TEAR OF TALOFIBULAR LIGAMENT OF RIGHT LOWER EXTREMITY, SUBSEQUENT ENCOUNTER: ICD-10-CM

## 2018-09-11 DIAGNOSIS — T14.8XXA CONTUSION OF BONE: ICD-10-CM

## 2018-09-11 PROCEDURE — 99213 OFFICE O/P EST LOW 20 MIN: CPT | Performed by: INTERNAL MEDICINE

## 2018-09-11 NOTE — LETTER
September 11, 2018     Patient: Timothy Monge   YOB: 1995   Date of Visit: 9/11/2018       To Whom it May Concern:    Davon Martinez is under my professional care  She was seen in my office on 9/11/2018  She is able to resume work, sports, and gym activities without any restrictions  If you have any questions or concerns, please don't hesitate to call           Sincerely,          Shaylee Toscano MD        CC: No Recipients

## 2018-09-11 NOTE — PROGRESS NOTES
Assessment/Plan:  Assessment/Plan   Diagnoses and all orders for this visit:    Closed nondisplaced fracture of cuboid of right foot with routine healing, subsequent encounter    Contusion of bone    Tear of talofibular ligament of right lower extremity, subsequent encounter          Teena's physical examination is essentially normal today  Therefore, I have cleared her to resume all activities as tolerated  I have discharged patient from my service today  Patient can followup with me as needed or if any issues arises  Patient was advised to call and return to the clinic sooner or go to the closest emergency room if she develops any symptom exacerbation  This document was recorded using voice recognition software and errors may be noted  Subjective:   Patient ID: Candice Mercedes is a 24 y o  female  HPI  Sonia Leahy presents for follow-up re-evaluation of her right foot and ankle injury with a resultant cuboid fracture and ankle sprain  On today's presentation, she reports that her pain has remarkably improved  She states that she occasionally gets a "twinge" here and there  Otherwise, no other complaints  The following portions of the patient's history were reviewed and updated as appropriate: allergies, current medications, past family history, past medical history, past social history, past surgical history and problem list     Review of Systems  Review of Systems   Constitutional: Negative  HENT: Negative  Eyes: Negative  Respiratory: Negative  Cardiovascular: Negative  Musculoskeletal:        As per history of present illness   Skin: Negative  Neurological:        As per history of present illness   Psychiatric/Behavioral: Negative          Objective:  Vitals:    09/11/18 0807   BP: 120/83   BP Location: Right arm   Patient Position: Sitting   Cuff Size: Adult   Pulse: 88   Weight: 104 kg (228 lb 9 6 oz)        Right Ankle Exam   Right ankle exam is normal           Physical Exam  Constitutional: Oriented to person, place, and time  Well-developed and well-nourished  HENT:   Head: Normocephalic and atraumatic  Eyes: Conjunctivae are normal    Cardiovascular: Normal rate  Pulmonary/Chest: Effort normal    Neurological: Alert and oriented to person, place, and time  Skin: Skin is warm and dry  Psychiatric: Normal mood and affect

## 2019-03-18 ENCOUNTER — OFFICE VISIT (OUTPATIENT)
Dept: INTERNAL MEDICINE CLINIC | Age: 24
End: 2019-03-18
Payer: COMMERCIAL

## 2019-03-18 VITALS
WEIGHT: 235 LBS | TEMPERATURE: 97.8 F | BODY MASS INDEX: 36.88 KG/M2 | HEART RATE: 96 BPM | DIASTOLIC BLOOD PRESSURE: 70 MMHG | HEIGHT: 67 IN | SYSTOLIC BLOOD PRESSURE: 110 MMHG

## 2019-03-18 DIAGNOSIS — R05.9 COUGH: Primary | ICD-10-CM

## 2019-03-18 DIAGNOSIS — E66.9 OBESITY (BMI 35.0-39.9 WITHOUT COMORBIDITY): ICD-10-CM

## 2019-03-18 PROBLEM — R10.31 RIGHT LOWER QUADRANT ABDOMINAL PAIN: Status: RESOLVED | Noted: 2018-07-02 | Resolved: 2019-03-18

## 2019-03-18 PROBLEM — T14.8XXA CONTUSION OF BONE: Status: RESOLVED | Noted: 2018-07-28 | Resolved: 2019-03-18

## 2019-03-18 PROBLEM — S93.401A SPRAIN OF UNSPECIFIED LIGAMENT OF RIGHT ANKLE, INITIAL ENCOUNTER: Status: RESOLVED | Noted: 2018-07-28 | Resolved: 2019-03-18

## 2019-03-18 PROCEDURE — 99213 OFFICE O/P EST LOW 20 MIN: CPT | Performed by: INTERNAL MEDICINE

## 2019-03-18 PROCEDURE — 3008F BODY MASS INDEX DOCD: CPT | Performed by: INTERNAL MEDICINE

## 2019-03-18 PROCEDURE — 1036F TOBACCO NON-USER: CPT | Performed by: INTERNAL MEDICINE

## 2019-03-18 RX ORDER — DEXTROMETHORPHAN HYDROBROMIDE AND PROMETHAZINE HYDROCHLORIDE 15; 6.25 MG/5ML; MG/5ML
5 SYRUP ORAL 4 TIMES DAILY PRN
Qty: 118 ML | Refills: 1 | Status: SHIPPED | OUTPATIENT
Start: 2019-03-18 | End: 2019-03-25

## 2019-03-18 RX ORDER — AZITHROMYCIN 250 MG/1
TABLET, FILM COATED ORAL
Qty: 6 TABLET | Refills: 0 | Status: SHIPPED | OUTPATIENT
Start: 2019-03-18 | End: 2019-03-23

## 2019-03-18 NOTE — PATIENT INSTRUCTIONS
Obesity   AMBULATORY CARE:   Obesity  is when your body mass index (BMI) is greater than 30  Your healthcare provider will use your height and weight to measure your BMI  The risks of obesity include  many health problems, such as injuries or physical disability  You may need tests to check for the following:  · Diabetes     · High blood pressure or high cholesterol     · Heart disease     · Gallbladder or liver disease     · Cancer of the colon, breast, prostate, liver, or kidney     · Sleep apnea     · Arthritis or gout  Seek care immediately if:   · You have a severe headache, confusion, or difficulty speaking  · You have weakness on one side of your body  · You have chest pain, sweating, or shortness of breath  Contact your healthcare provider if:   · You have symptoms of gallbladder or liver disease, such as pain in your upper abdomen  · You have knee or hip pain and discomfort while walking  · You have symptoms of diabetes, such as intense hunger and thirst, and frequent urination  · You have symptoms of sleep apnea, such as snoring or daytime sleepiness  · You have questions or concerns about your condition or care  Treatment for obesity  focuses on helping you lose weight to improve your health  Even a small decrease in BMI can reduce the risk for many health problems  Your healthcare provider will help you set a weight-loss goal   · Lifestyle changes  are the first step in treating obesity  These include making healthy food choices and getting regular physical activity  Your healthcare provider may suggest a weight-loss program that involves coaching, education, and therapy  · Medicine  may help you lose weight when it is used with a healthy diet and physical activity  · Surgery  can help you lose weight if you are very obese and have other health problems  There are several types of weight-loss surgery  Ask your healthcare provider for more information    Be successful losing weight:   · Set small, realistic goals  An example of a small goal is to walk for 20 minutes 5 days a week  Anther goal is to lose 5% of your body weight  · Tell friends, family members, and coworkers about your goals  and ask for their support  Ask a friend to lose weight with you, or join a weight-loss support group  · Identify foods or triggers that may cause you to overeat , and find ways to avoid them  Remove tempting high-calorie foods from your home and workplace  Place a bowl of fresh fruit on your kitchen counter  If stress causes you to eat, then find other ways to cope with stress  · Keep a diary to track what you eat and drink  Also write down how many minutes of physical activity you do each day  Weigh yourself once a week and record it in your diary  Eating changes: You will need to eat 500 to 1,000 fewer calories each day than you currently eat to lose 1 to 2 pounds a week  The following changes will help you cut calories:  · Eat smaller portions  Use small plates, no larger than 9 inches in diameter  Fill your plate half full of fruits and vegetables  Measure your food using measuring cups until you know what a serving size looks like  · Eat 3 meals and 1 or 2 snacks each day  Plan your meals in advance  [place] and eat at home most of the time  Eat slowly  · Eat fruits and vegetables at every meal   They are low in calories and high in fiber, which makes you feel full  Do not add butter, margarine, or cream sauce to vegetables  Use herbs to season steamed vegetables  · Eat less fat and fewer fried foods  Eat more baked or grilled chicken and fish  These protein sources are lower in calories and fat than red meat  Limit fast food  Dress your salads with olive oil and vinegar instead of bottled dressing  · Limit the amount of sugar you eat  Do not drink sugary beverages  Limit alcohol  Activity changes:  Physical activity is good for your body in many ways   It helps you burn calories and build strong muscles  It decreases stress and depression, and improves your mood  It can also help you sleep better  Talk to your healthcare provider before you begin an exercise program   · Exercise for at least 30 minutes 5 days a week  Start slowly  Set aside time each day for physical activity that you enjoy and that is convenient for you  It is best to do both weight training and an activity that increases your heart rate, such as walking, bicycling, or swimming  · Find ways to be more active  Do yard work and housecleaning  Walk up the stairs instead of using elevators  Spend your leisure time going to events that require walking, such as outdoor festivals or fairs  This extra physical activity can help you lose weight and keep it off  Follow up with your healthcare provider as directed: You may need to meet with a dietitian  Write down your questions so you remember to ask them during your visits  © 2017 [address] [employer] Information is for End User's use only and may not be sold, redistributed or otherwise used for commercial purposes  All illustrations and images included in CareNotes® are the copyrighted property of [employer]  or Jeff Yanes  The above information is an  only  It is not intended as medical advice for individual conditions or treatments  Talk to your doctor, nurse or pharmacist before following any medical regimen to see if it is safe and effective for you  Heart Healthy Diet   AMBULATORY CARE:   A heart healthy diet  is an eating plan low in total fat, unhealthy fats, and sodium (salt)  A heart healthy diet helps decrease your risk for heart disease and stroke  Limit the amount of fat you eat to 25% to 35% of your total daily calories  Limit sodium to less than 2,300 mg each day  Healthy fats:  Healthy fats can help improve cholesterol levels   The risk for heart disease is decreased when cholesterol levels are normal  Choose healthy fats, such as the following:  · Unsaturated fat  is found in foods such as soybean, canola, olive, corn, and safflower oils  It is also found in soft tub margarine that is made with liquid vegetable oil  · Omega-3 fat  is found in certain fish, such as salmon, tuna, and trout, and in walnuts and flaxseed  Unhealthy fats:  Unhealthy fats can cause unhealthy cholesterol levels in your blood and increase your risk of heart disease  Limit unhealthy fats, such as the following:  · Cholesterol  is found in animal foods, such as eggs and lobster, and in dairy products made from whole milk  Limit cholesterol to less than 300 milligrams (mg) each day  You may need to limit cholesterol to 200 mg each day if you have heart disease  · Saturated fat  is found in meats, such as seaman and hamburger  It is also found in chicken or turkey skin, whole milk, and butter  Limit saturated fat to less than 7% of your total daily calories  Limit saturated fat to less than 6% if you have heart disease or are at increased risk for it  · Trans fat  is found in packaged foods, such as potato chips and cookies  It is also in hard margarine, some fried foods, and shortening  Avoid trans fats as much as possible    Heart healthy foods and drinks to include:  Ask your dietitian or healthcare provider how many servings to have from each of the following food groups:  · Grains:      ¨ Whole-wheat breads, cereals, and pastas, and brown rice    ¨ Low-fat, low-sodium crackers and chips    · Vegetables:      ¨ Broccoli, green beans, green peas, and spinach    ¨ Collards, kale, and lima beans    ¨ Carrots, sweet potatoes, tomatoes, and peppers    ¨ Canned vegetables with no salt added    · Fruits:      ¨ Bananas, peaches, pears, and pineapple    ¨ Grapes, raisins, and dates    ¨ Oranges, tangerines, grapefruit, orange juice, and grapefruit juice    ¨ Apricots, mangoes, melons, and papaya    ¨ Raspberries and strawberries    ¨ Canned fruit with no added sugar    · Low-fat dairy products:      ¨ Nonfat (skim) milk, 1% milk, and low-fat almond, cashew, or soy milks fortified with calcium    ¨ Low-fat cheese, regular or frozen yogurt, and cottage cheese    · Meats and proteins , such as lean cuts of beef and pork (loin, leg, round), skinless chicken and turkey, legumes, soy products, egg whites, and nuts  Foods and drinks to limit or avoid:  Ask your dietitian or healthcare provider about these and other foods that are high in unhealthy fat, sodium, and sugar:  · Snack or packaged foods , such as frozen dinners, cookies, macaroni and cheese, and cereals with more than 300 mg of sodium per serving    · Canned or dry mixes  for cakes, soups, sauces, or gravies    · Vegetables with added sodium , such as instant potatoes, vegetables with added sauces, or regular canned vegetables    · Other foods high in sodium , such as ketchup, barbecue sauce, salad dressing, pickles, olives, soy sauce, and miso    · High-fat dairy foods  such as whole or 2% milk, cream cheese, or sour cream, and cheeses     · High-fat protein foods  such as high-fat cuts of beef (T-bone steaks, ribs), chicken or turkey with skin, and organ meats, such as liver    · Cured or smoked meats , such as hot dogs, seaman, and sausage    · Unhealthy fats and oils , such as butter, stick margarine, shortening, and cooking oils such as coconut or palm oil    · Food and drinks high in sugar , such as soft drinks (soda), sports drinks, sweetened tea, candy, cake, cookies, pies, and doughnuts  Other diet guidelines to follow:   · Eat more foods containing omega-3 fats  Eat fish high in omega-3 fats at least 2 times a week  · Limit alcohol  Too much alcohol can damage your heart and raise your blood pressure  Women should limit alcohol to 1 drink a day  Men should limit alcohol to 2 drinks a day   A drink of alcohol is 12 ounces of beer, 5 ounces of wine, or 1½ ounces of liquor  · Choose low-sodium foods  High-sodium foods can lead to high blood pressure  Add little or no salt to food you prepare  Use herbs and spices in place of salt  · Eat more fiber  to help lower cholesterol levels  Eat at least 5 servings of fruits and vegetables each day  Eat 3 ounces of whole-grain foods each day  Legumes (beans) are also a good source of fiber  Lifestyle guidelines:   · Do not smoke  Nicotine and other chemicals in cigarettes and cigars can cause lung and heart damage  Ask your healthcare provider for information if you currently smoke and need help to quit  E-cigarettes or smokeless tobacco still contain nicotine  Talk to your healthcare provider before you use these products  · Exercise regularly  to help you maintain a healthy weight and improve your blood pressure and cholesterol levels  Ask your healthcare provider about the best exercise plan for you  Do not start an exercise program without asking your healthcare provider  Follow up with your healthcare provider as directed:  Write down your questions so you remember to ask them during your visits  © 2017 [facility] [facility] Information is for End User's use only and may not be sold, redistributed or otherwise used for commercial purposes  All illustrations and images included in CareNotes® are the copyrighted property of [employer]  or Jeff Yanes  The above information is an  only  It is not intended as medical advice for individual conditions or treatments  Talk to your doctor, nurse or pharmacist before following any medical regimen to see if it is safe and effective for you  Obesity   AMBULATORY CARE:   Obesity  is when your body mass index (BMI) is greater than 30  Your healthcare provider will use your height and weight to measure your BMI  The risks of obesity include  many health problems, such as injuries or physical disability   You may need tests to check for the following:  · Diabetes     · High blood pressure or high cholesterol     · Heart disease     · Gallbladder or liver disease     · Cancer of the colon, breast, prostate, liver, or kidney     · Sleep apnea     · Arthritis or gout  Seek care immediately if:   · You have a severe headache, confusion, or difficulty speaking  · You have weakness on one side of your body  · You have chest pain, sweating, or shortness of breath  Contact your healthcare provider if:   · You have symptoms of gallbladder or liver disease, such as pain in your upper abdomen  · You have knee or hip pain and discomfort while walking  · You have symptoms of diabetes, such as intense hunger and thirst, and frequent urination  · You have symptoms of sleep apnea, such as snoring or daytime sleepiness  · You have questions or concerns about your condition or care  Treatment for obesity  focuses on helping you lose weight to improve your health  Even a small decrease in BMI can reduce the risk for many health problems  Your healthcare provider will help you set a weight-loss goal   · Lifestyle changes  are the first step in treating obesity  These include making healthy food choices and getting regular physical activity  Your healthcare provider may suggest a weight-loss program that involves coaching, education, and therapy  · Medicine  may help you lose weight when it is used with a healthy diet and physical activity  · Surgery  can help you lose weight if you are very obese and have other health problems  There are several types of weight-loss surgery  Ask your healthcare provider for more information  Be successful losing weight:   · Set small, realistic goals  An example of a small goal is to walk for 20 minutes 5 days a week  Cristian goal is to lose 5% of your body weight  · Tell friends, family members, and coworkers about your goals  and ask for their support   Ask a friend to lose weight with you, or join a weight-loss support group  · Identify foods or triggers that may cause you to overeat , and find ways to avoid them  Remove tempting high-calorie foods from your home and workplace  Place a bowl of fresh fruit on your kitchen counter  If stress causes you to eat, then find other ways to cope with stress  · Keep a diary to track what you eat and drink  Also write down how many minutes of physical activity you do each day  Weigh yourself once a week and record it in your diary  Eating changes: You will need to eat 500 to 1,000 fewer calories each day than you currently eat to lose 1 to 2 pounds a week  The following changes will help you cut calories:  · Eat smaller portions  Use small plates, no larger than 9 inches in diameter  Fill your plate half full of fruits and vegetables  Measure your food using measuring cups until you know what a serving size looks like  · Eat 3 meals and 1 or 2 snacks each day  Plan your meals in advance  [place] and eat at home most of the time  Eat slowly  · Eat fruits and vegetables at every meal   They are low in calories and high in fiber, which makes you feel full  Do not add butter, margarine, or cream sauce to vegetables  Use herbs to season steamed vegetables  · Eat less fat and fewer fried foods  Eat more baked or grilled chicken and fish  These protein sources are lower in calories and fat than red meat  Limit fast food  Dress your salads with olive oil and vinegar instead of bottled dressing  · Limit the amount of sugar you eat  Do not drink sugary beverages  Limit alcohol  Activity changes:  Physical activity is good for your body in many ways  It helps you burn calories and build strong muscles  It decreases stress and depression, and improves your mood  It can also help you sleep better  Talk to your healthcare provider before you begin an exercise program   · Exercise for at least 30 minutes 5 days a week  Start slowly   Set aside time each day for physical activity that you enjoy and that is convenient for you  It is best to do both weight training and an activity that increases your heart rate, such as walking, bicycling, or swimming  · Find ways to be more active  Do yard work and housecleaning  Walk up the stairs instead of using elevators  Spend your leisure time going to events that require walking, such as outdoor festivals or fairs  This extra physical activity can help you lose weight and keep it off  Follow up with your healthcare provider as directed: You may need to meet with a dietitian  Write down your questions so you remember to ask them during your visits  © 2017 [address] [employer] Information is for End User's use only and may not be sold, redistributed or otherwise used for commercial purposes  All illustrations and images included in CareNotes® are the copyrighted property of [employer]  or Jeff Yanes  The above information is an  only  It is not intended as medical advice for individual conditions or treatments  Talk to your doctor, nurse or pharmacist before following any medical regimen to see if it is safe and effective for you  Heart Healthy Diet   AMBULATORY CARE:   A heart healthy diet  is an eating plan low in total fat, unhealthy fats, and sodium (salt)  A heart healthy diet helps decrease your risk for heart disease and stroke  Limit the amount of fat you eat to 25% to 35% of your total daily calories  Limit sodium to less than 2,300 mg each day  Healthy fats:  Healthy fats can help improve cholesterol levels  The risk for heart disease is decreased when cholesterol levels are normal  Choose healthy fats, such as the following:  · Unsaturated fat  is found in foods such as soybean, canola, olive, corn, and safflower oils  It is also found in soft tub margarine that is made with liquid vegetable oil       · Omega-3 fat  is found in certain fish, such as salmon, tuna, and trout, and in walnuts and flaxseed  Unhealthy fats:  Unhealthy fats can cause unhealthy cholesterol levels in your blood and increase your risk of heart disease  Limit unhealthy fats, such as the following:  · Cholesterol  is found in animal foods, such as eggs and lobster, and in dairy products made from whole milk  Limit cholesterol to less than 300 milligrams (mg) each day  You may need to limit cholesterol to 200 mg each day if you have heart disease  · Saturated fat  is found in meats, such as seaman and hamburger  It is also found in chicken or turkey skin, whole milk, and butter  Limit saturated fat to less than 7% of your total daily calories  Limit saturated fat to less than 6% if you have heart disease or are at increased risk for it  · Trans fat  is found in packaged foods, such as potato chips and cookies  It is also in hard margarine, some fried foods, and shortening  Avoid trans fats as much as possible    Heart healthy foods and drinks to include:  Ask your dietitian or healthcare provider how many servings to have from each of the following food groups:  · Grains:      ¨ Whole-wheat breads, cereals, and pastas, and brown rice    ¨ Low-fat, low-sodium crackers and chips    · Vegetables:      ¨ Broccoli, green beans, green peas, and spinach    ¨ Collards, kale, and lima beans    ¨ Carrots, sweet potatoes, tomatoes, and peppers    ¨ Canned vegetables with no salt added    · Fruits:      ¨ Bananas, peaches, pears, and pineapple    ¨ Grapes, raisins, and dates    ¨ Oranges, tangerines, grapefruit, orange juice, and grapefruit juice    ¨ Apricots, mangoes, melons, and papaya    ¨ Raspberries and strawberries    ¨ Canned fruit with no added sugar    · Low-fat dairy products:      ¨ Nonfat (skim) milk, 1% milk, and low-fat almond, cashew, or soy milks fortified with calcium    ¨ Low-fat cheese, regular or frozen yogurt, and cottage cheese    · Meats and proteins , such as lean cuts of beef and pork (loin, leg, round), skinless chicken and turkey, legumes, soy products, egg whites, and nuts  Foods and drinks to limit or avoid:  Ask your dietitian or healthcare provider about these and other foods that are high in unhealthy fat, sodium, and sugar:  · Snack or packaged foods , such as frozen dinners, cookies, macaroni and cheese, and cereals with more than 300 mg of sodium per serving    · Canned or dry mixes  for cakes, soups, sauces, or gravies    · Vegetables with added sodium , such as instant potatoes, vegetables with added sauces, or regular canned vegetables    · Other foods high in sodium , such as ketchup, barbecue sauce, salad dressing, pickles, olives, soy sauce, and miso    · High-fat dairy foods  such as whole or 2% milk, cream cheese, or sour cream, and cheeses     · High-fat protein foods  such as high-fat cuts of beef (T-bone steaks, ribs), chicken or turkey with skin, and organ meats, such as liver    · Cured or smoked meats , such as hot dogs, seaman, and sausage    · Unhealthy fats and oils , such as butter, stick margarine, shortening, and cooking oils such as coconut or palm oil    · Food and drinks high in sugar , such as soft drinks (soda), sports drinks, sweetened tea, candy, cake, cookies, pies, and doughnuts  Other diet guidelines to follow:   · Eat more foods containing omega-3 fats  Eat fish high in omega-3 fats at least 2 times a week  · Limit alcohol  Too much alcohol can damage your heart and raise your blood pressure  Women should limit alcohol to 1 drink a day  Men should limit alcohol to 2 drinks a day  A drink of alcohol is 12 ounces of beer, 5 ounces of wine, or 1½ ounces of liquor  · Choose low-sodium foods  High-sodium foods can lead to high blood pressure  Add little or no salt to food you prepare  Use herbs and spices in place of salt  · Eat more fiber  to help lower cholesterol levels  Eat at least 5 servings of fruits and vegetables each day   Eat 3 ounces of whole-grain foods each day  Legumes (beans) are also a good source of fiber  Lifestyle guidelines:   · Do not smoke  Nicotine and other chemicals in cigarettes and cigars can cause lung and heart damage  Ask your healthcare provider for information if you currently smoke and need help to quit  E-cigarettes or smokeless tobacco still contain nicotine  Talk to your healthcare provider before you use these products  · Exercise regularly  to help you maintain a healthy weight and improve your blood pressure and cholesterol levels  Ask your healthcare provider about the best exercise plan for you  Do not start an exercise program without asking your healthcare provider  Follow up with your healthcare provider as directed:  Write down your questions so you remember to ask them during your visits  © 2017 [address] [facility] Information is for End User's use only and may not be sold, redistributed or otherwise used for commercial purposes  All illustrations and images included in CareNotes® are the copyrighted property of [employer]  or Jeff Yanes  The above information is an  only  It is not intended as medical advice for individual conditions or treatments  Talk to your doctor, nurse or pharmacist before following any medical regimen to see if it is safe and effective for you

## 2019-03-18 NOTE — ASSESSMENT & PLAN NOTE
Patient complains of a dry cough for the past month but will just not go away  He keeps her awake at night  She denies any wheezing or URI symptoms    Will try Z-Jak along with a cough syrup

## 2019-03-18 NOTE — PROGRESS NOTES
Assessment/Plan:    Cough  Patient complains of a dry cough for the past month but will just not go away  He keeps her awake at night  She denies any wheezing or URI symptoms  Will try Z-Jak along with a cough syrup    Obese  Patient continues to gain weight and again was counseled on diet and exercise for same  Diagnoses and all orders for this visit:    Cough  -     azithromycin (ZITHROMAX) 250 mg tablet; Take 2 tablets today then 1 tablet daily x 4 days  -     promethazine-dextromethorphan (PHENERGAN-DM) 6 25-15 mg/5 mL oral syrup; Take 5 mL by mouth 4 (four) times a day as needed for cough for up to 7 days    Obesity (BMI 35 0-39 9 without comorbidity)          Subjective:      Patient ID: Aj Bowen is a 24 y o  female  Cough   This is a chronic problem  The current episode started 1 to 4 weeks ago  The problem has been waxing and waning  The problem occurs constantly  The cough is non-productive  Pertinent negatives include no chest pain, chills, ear pain, fever, headaches, myalgias, postnasal drip, rash, sore throat, shortness of breath or wheezing  Associated symptoms comments: No associated symptoms  The symptoms are aggravated by lying down  She has tried body position changes, rest and OTC cough suppressant for the symptoms  The treatment provided mild relief  There is no history of asthma, COPD, environmental allergies or pneumonia  Review of Systems   Constitutional: Negative for chills, fatigue, fever and unexpected weight change  HENT: Negative for congestion, ear pain, hearing loss, postnasal drip, sinus pressure, sore throat, trouble swallowing and voice change  Eyes: Negative for visual disturbance  Respiratory: Positive for cough  Negative for chest tightness, shortness of breath and wheezing  Cardiovascular: Negative for chest pain, palpitations and leg swelling     Gastrointestinal: Negative for abdominal distention, abdominal pain, anal bleeding, blood in stool, constipation, diarrhea and nausea  Endocrine: Negative for cold intolerance, polydipsia, polyphagia and polyuria  Genitourinary: Negative for dysuria, flank pain, frequency, hematuria and urgency  Musculoskeletal: Negative for arthralgias, back pain, gait problem, joint swelling, myalgias and neck pain  Skin: Negative for rash  Allergic/Immunologic: Negative for environmental allergies and immunocompromised state  Neurological: Negative for dizziness, syncope, facial asymmetry, weakness, light-headedness, numbness and headaches  Hematological: Negative for adenopathy  Psychiatric/Behavioral: Negative for confusion, sleep disturbance and suicidal ideas  Objective:      /70 (BP Location: Left arm, Patient Position: Sitting)   Pulse 96   Temp 97 8 °F (36 6 °C) (Tympanic)   Ht 5' 7" (1 702 m)   Wt 107 kg (235 lb)   BMI 36 81 kg/m²          Physical Exam   Constitutional: She is oriented to person, place, and time  She appears well-developed and well-nourished  No distress  Obese   HENT:   Right Ear: External ear normal    Left Ear: External ear normal    Nose: Nose normal    Mouth/Throat: Oropharynx is clear and moist  No oropharyngeal exudate  Eyes: Pupils are equal, round, and reactive to light  EOM are normal    Neck: Normal range of motion  Neck supple  No JVD present  No thyromegaly present  Cardiovascular: Normal rate, regular rhythm, normal heart sounds and intact distal pulses  Exam reveals no gallop  No murmur heard  Pulmonary/Chest: Effort normal and breath sounds normal  No respiratory distress  She has no wheezes  She has no rales  Abdominal: Soft  Bowel sounds are normal  She exhibits no distension and no mass  There is no tenderness  Musculoskeletal: Normal range of motion  She exhibits no tenderness  Lymphadenopathy:     She has no cervical adenopathy  Neurological: She is alert and oriented to person, place, and time  No cranial nerve deficit  Coordination normal    Skin: No rash noted  Psychiatric: She has a normal mood and affect  Her behavior is normal  Judgment and thought content normal      BMI Counseling: Body mass index is 36 81 kg/m²  Discussed the patient's BMI with her  The BMI is above average  BMI counseling and education was provided to the patient  Exercise recommendations include exercising 3-5 times per week

## 2019-03-18 NOTE — PROGRESS NOTES
BMI Counseling: Body mass index is 36 81 kg/m²  Discussed the patient's BMI with her   The BMI {[facility] BMI Counselin}

## 2019-06-04 ENCOUNTER — OFFICE VISIT (OUTPATIENT)
Dept: INTERNAL MEDICINE CLINIC | Age: 24
End: 2019-06-04
Payer: COMMERCIAL

## 2019-06-04 VITALS
HEART RATE: 96 BPM | WEIGHT: 231.2 LBS | OXYGEN SATURATION: 98 % | TEMPERATURE: 98.4 F | HEIGHT: 67 IN | DIASTOLIC BLOOD PRESSURE: 66 MMHG | SYSTOLIC BLOOD PRESSURE: 108 MMHG | BODY MASS INDEX: 36.29 KG/M2

## 2019-06-04 DIAGNOSIS — J06.9 UPPER RESPIRATORY TRACT INFECTION, UNSPECIFIED TYPE: Primary | ICD-10-CM

## 2019-06-04 PROBLEM — R05.9 COUGH: Status: RESOLVED | Noted: 2019-03-18 | Resolved: 2019-06-04

## 2019-06-04 PROCEDURE — 99213 OFFICE O/P EST LOW 20 MIN: CPT | Performed by: INTERNAL MEDICINE

## 2019-06-04 PROCEDURE — 3008F BODY MASS INDEX DOCD: CPT | Performed by: INTERNAL MEDICINE

## 2019-06-04 PROCEDURE — 1036F TOBACCO NON-USER: CPT | Performed by: INTERNAL MEDICINE

## 2019-06-04 RX ORDER — DESOGESTREL AND ETHINYL ESTRADIOL 0.15-0.03
1 KIT ORAL DAILY
COMMUNITY
Start: 2019-05-29

## 2019-06-04 RX ORDER — FEXOFENADINE HCL 180 MG/1
180 TABLET ORAL DAILY
Qty: 30 TABLET | Refills: 1 | Status: SHIPPED | OUTPATIENT
Start: 2019-06-04 | End: 2021-05-21 | Stop reason: SDUPTHER

## 2019-06-04 RX ORDER — AZITHROMYCIN 250 MG/1
TABLET, FILM COATED ORAL
Qty: 6 TABLET | Refills: 0 | Status: SHIPPED | OUTPATIENT
Start: 2019-06-04 | End: 2019-06-09

## 2019-12-04 ENCOUNTER — OFFICE VISIT (OUTPATIENT)
Dept: INTERNAL MEDICINE CLINIC | Age: 24
End: 2019-12-04
Payer: COMMERCIAL

## 2019-12-04 VITALS
TEMPERATURE: 98 F | HEART RATE: 100 BPM | SYSTOLIC BLOOD PRESSURE: 110 MMHG | BODY MASS INDEX: 36 KG/M2 | HEIGHT: 67 IN | WEIGHT: 229.4 LBS | DIASTOLIC BLOOD PRESSURE: 80 MMHG | OXYGEN SATURATION: 97 %

## 2019-12-04 DIAGNOSIS — R07.89 COSTOCHONDRAL CHEST PAIN: ICD-10-CM

## 2019-12-04 DIAGNOSIS — K21.9 GASTROESOPHAGEAL REFLUX DISEASE, ESOPHAGITIS PRESENCE NOT SPECIFIED: ICD-10-CM

## 2019-12-04 DIAGNOSIS — R07.9 CHEST PAIN, UNSPECIFIED TYPE: Primary | ICD-10-CM

## 2019-12-04 PROBLEM — J06.9 UPPER RESPIRATORY TRACT INFECTION: Status: RESOLVED | Noted: 2019-06-04 | Resolved: 2019-12-04

## 2019-12-04 PROCEDURE — 3008F BODY MASS INDEX DOCD: CPT | Performed by: INTERNAL MEDICINE

## 2019-12-04 PROCEDURE — 99214 OFFICE O/P EST MOD 30 MIN: CPT | Performed by: INTERNAL MEDICINE

## 2019-12-04 PROCEDURE — 93000 ELECTROCARDIOGRAM COMPLETE: CPT | Performed by: INTERNAL MEDICINE

## 2019-12-04 PROCEDURE — 1036F TOBACCO NON-USER: CPT | Performed by: INTERNAL MEDICINE

## 2019-12-04 RX ORDER — NAPROXEN 375 MG/1
375 TABLET ORAL 2 TIMES DAILY WITH MEALS
Qty: 60 TABLET | Refills: 1 | Status: SHIPPED | OUTPATIENT
Start: 2019-12-04 | End: 2021-05-21

## 2019-12-04 RX ORDER — PANTOPRAZOLE SODIUM 20 MG/1
20 TABLET, DELAYED RELEASE ORAL
Qty: 30 TABLET | Refills: 3 | Status: SHIPPED | OUTPATIENT
Start: 2019-12-04 | End: 2021-05-21

## 2019-12-04 NOTE — ASSESSMENT & PLAN NOTE
On exam the chest pain was reproduced with sternal pressure  Will try Naprosyn 375 b i d  With food 2 weeks

## 2019-12-04 NOTE — ASSESSMENT & PLAN NOTE
Her big complaint is substernal chest discomfort, somewhat related to position     On further questioning she does admit chronic indigestion and reflux  But not showed no shortness of breath or trauma    EKG was normal

## 2019-12-04 NOTE — PATIENT INSTRUCTIONS
Gastroesophageal Reflux Disease   AMBULATORY CARE:   Gastroesophageal reflux  reflux occurs when acid and food in the stomach back up into the esophagus  Gastroesophageal reflux disease (GERD) is reflux that occurs more than twice a week for a few weeks  It usually causes heartburn and other symptoms  GERD can cause other health problems over time if it is not treated  Common symptoms include:  Heartburn is the most common symptom of GERD  You may feel burning pain in your chest or below the breast bone  This usually occurs after meals and spreads to your neck, jaw, or shoulder  The pain gets better when you change positions  You may also have any of the following:  · Bitter or acid taste in your mouth    · Dry cough    · Trouble swallowing or pain with swallowing    · Hoarseness or sore throat    · Frequent burping or hiccups    · Feeling of fullness soon after you start eating  Seek care immediately if:  · You feel full and cannot burp or vomit  · You have severe chest pain and sudden trouble breathing  · Your bowel movements are black, bloody, or tarry-looking  · Your vomit looks like coffee grounds or has blood in it  Contact your healthcare provider if:   · You vomit large amounts, or you vomit often  · You have trouble breathing after you vomit  · You have trouble swallowing, or pain with swallowing  · You are losing weight without trying  · Your symptoms get worse or do not improve with treatment  · You have questions or concerns about your condition or care  Treatment for GERD:  Your healthcare provider may prescribe medicine to decrease stomach acid  He may also prescribe medicine that help your esophagus and stomach move food and liquid to your intestines  Surgery may be done if other treatments do not work  You may need surgery to wrap the upper part of the stomach around the esophageal sphincter  This will strengthen the sphincter and prevent reflux     Manage GERD: · Do not have foods or drinks that may increase heartburn  These include chocolate, peppermint, fried or fatty foods, drinks that contain caffeine, or carbonated drinks (soda)  Other foods include spicy foods, onions, tomatoes, and tomato-based foods  Do not have foods or drinks that can irritate your esophagus, such as citrus fruits, juices, and alcohol  · Do not eat large meals  When you eat a lot of food at one time, your stomach needs more acid to digest it  Eat 6 small meals each day instead of 3 large ones, and eat slowly  Do not eat meals 2 to 3 hours before bedtime  · Elevate the head of your bed  Place 6-inch blocks under the head of your bed frame  You may also use more than one pillow under your head and shoulders while you sleep  · Maintain a healthy weight  If you are overweight, weight loss may help relieve symptoms of GERD  · Do not smoke  Smoking weakens the lower esophageal sphincter and increases the risk of GERD  Ask your healthcare provider for information if you currently smoke and need help to quit  E-cigarettes or smokeless tobacco still contain nicotine  Talk to your healthcare provider before you use these products  · Do not wear clothing that is tight around your waist   Tight clothing can put pressure on your stomach and cause or worsen GERD symptoms  Follow up with your healthcare provider as directed:  Write down your questions so you remember to ask them during your visits  © 2017 [employer] Information is for End User's use only and may not be sold, redistributed or otherwise used for commercial purposes  All illustrations and images included in CareNotes® are the copyrighted property of [employer]  or [employer]  The above information is an  only  It is not intended as medical advice for individual conditions or treatments   Talk to your doctor, nurse or pharmacist before following any medical regimen to see if it is safe and effective for you  Costochondritis   AMBULATORY CARE:   Costochondritis  is a condition that causes pain in the cartilage that connects your ribs to your sternum (breastbone)  Cartilage is the tough, bendable tissue that protects your bones  Common symptoms include the following:   · Sharp or dull, aching pain that may come and go or that gets worse over time    · Pain when you touch your chest    · Pain that spreads to your back, abdomen, or down your arm    · Pain that gets worse when you move, breathe deeply, or push or lift an object    · Trouble sleeping or doing your usual activities because of the pain  Seek immediate care if:   · Fever    · The painful areas of your chest look swollen and red, and feel warm to the touch    · Pain prevents you from sleeping  Contact your healthcare provider if:   · You have a fever  · The painful areas of your chest look swollen, red, and feel warm to the touch  · You cannot sleep because of the pain  · You have questions or concerns about your condition or care  Treatment for costochondritis  may not be needed  Costochondritis pain may go away without treatment, usually within a year  Treatment may include any of the following:  · Acetaminophen  decreases pain  Acetaminophen is available without a doctor's order  Ask how much to take and how often to take it  Follow directions  Acetaminophen can cause liver damage if not taken correctly  · NSAIDs , such as ibuprofen, help decrease swelling, pain, and fever  This medicine is available with or without a doctor's order  NSAIDs can cause stomach bleeding or kidney problems in certain people  If you take blood thinner medicine, always ask if NSAIDs are safe for you  Always read the medicine label and follow directions  Do not give these medicines to children under 10months of age without direction from your child's healthcare provider  Manage your symptoms:   · Rest as needed    Avoid painful movements and activities  Do not carry objects, such as a purse or backpack, if this causes pain  Avoid activities such as weightlifting until your pain decreases or goes away  Ask your healthcare provider which activities are best for you to do while you recover  · Apply heat to help decrease pain  Apply heat on the area for 20 to 30 minutes every 2 hours for as many days as directed  · Apply ice to help decrease swelling and pain  Ice may also help prevent tissue damage  Use an ice pack, or put crushed ice in a plastic bag  Cover it with a towel and place it on the painful area for 15 to 20 minutes every hour or as directed  · Do gentle stretching exercises   a doorway and put your hands on the door frame at the level of your ears or shoulders  Take 1 step forward and gently stretch your chest  Try this with your hands higher up on the doorway  Follow up with your healthcare provider as directed:  Write down your questions so you remember to ask them during your visits  © 2017 [facility] Information is for End User's use only and may not be sold, redistributed or otherwise used for commercial purposes  All illustrations and images included in CareNotes® are the copyrighted property of [employer]  or [employer]  The above information is an  only  It is not intended as medical advice for individual conditions or treatments  Talk to your doctor, nurse or pharmacist before following any medical regimen to see if it is safe and effective for you

## 2019-12-04 NOTE — ASSESSMENT & PLAN NOTE
She does had it that she has some chronic indigestion and reflux and takes at acids periodically    In view of her symptoms and fact I gave her a nonsteroidal also give her Protonix for a month to see if it resolves

## 2019-12-04 NOTE — PROGRESS NOTES
Assessment/Plan:    Chest pain  Her big complaint is substernal chest discomfort, somewhat related to position     On further questioning she does admit chronic indigestion and reflux  But not showed no shortness of breath or trauma  EKG was normal    Costochondral chest pain  On exam the chest pain was reproduced with sternal pressure  Will try Naprosyn 375 b i d  With food 2 weeks  GERD (gastroesophageal reflux disease)  She does had it that she has some chronic indigestion and reflux and takes at acids periodically  In view of her symptoms and fact I gave her a nonsteroidal also give her Protonix for a month to see if it resolves       Diagnoses and all orders for this visit:    Chest pain, unspecified type  -     POCT ECG  -     naproxen (NAPROSYN) 375 mg tablet; Take 1 tablet (375 mg total) by mouth 2 (two) times a day with meals  -     pantoprazole (PROTONIX) 20 mg tablet; Take 1 tablet (20 mg total) by mouth daily before breakfast    Gastroesophageal reflux disease, esophagitis presence not specified    Costochondral chest pain          Subjective:      Patient ID: Tahmina Eid is a 21 y o  female  Chest Pain    This is a new problem  The current episode started in the past 7 days  The onset quality is gradual  The problem occurs daily  The problem has been unchanged  The pain is present in the substernal region  The pain is at a severity of 4/10  The pain is mild  The quality of the pain is described as dull  The pain does not radiate  Associated symptoms include exertional chest pressure  Pertinent negatives include no abdominal pain, back pain, claudication, cough, diaphoresis, dizziness, fever, headaches, irregular heartbeat, nausea, near-syncope, numbness, palpitations, shortness of breath or weakness  The pain is aggravated by emotional upset, movement and food  She has tried acetaminophen and antacids for the symptoms  The treatment provided mild relief   Risk factors include lack of exercise, obesity and sedentary lifestyle  Past medical history comments: No significant past medical history           Review of Systems   Constitutional: Negative for chills, diaphoresis, fatigue, fever and unexpected weight change  HENT: Negative for congestion, ear pain, hearing loss, postnasal drip, sinus pressure, sore throat, trouble swallowing and voice change  Eyes: Negative for visual disturbance  Respiratory: Negative for cough, chest tightness, shortness of breath and wheezing  Cardiovascular: Positive for chest pain  Negative for palpitations, claudication, leg swelling and near-syncope  Gastrointestinal: Negative for abdominal distention, abdominal pain, anal bleeding, blood in stool, constipation, diarrhea and nausea  Dyspepsia   Endocrine: Negative for cold intolerance, polydipsia, polyphagia and polyuria  Genitourinary: Negative for dysuria, flank pain, frequency, hematuria and urgency  Musculoskeletal: Negative for arthralgias, back pain, gait problem, joint swelling, myalgias and neck pain  Skin: Negative for rash  Allergic/Immunologic: Negative for immunocompromised state  Neurological: Negative for dizziness, syncope, facial asymmetry, weakness, light-headedness, numbness and headaches  Hematological: Negative for adenopathy  Psychiatric/Behavioral: Negative for confusion, sleep disturbance and suicidal ideas  Objective:      /80 (BP Location: Left arm, Patient Position: Sitting)   Pulse 100   Temp 98 °F (36 7 °C) (Tympanic)   Ht 5' 7" (1 702 m)   Wt 104 kg (229 lb 6 4 oz)   SpO2 97%   BMI 35 93 kg/m²          Physical Exam   Constitutional: She is oriented to person, place, and time  She appears well-developed and well-nourished  No distress  Obese   HENT:   Right Ear: External ear normal    Left Ear: External ear normal    Nose: Nose normal    Mouth/Throat: Oropharynx is clear and moist  No oropharyngeal exudate     Eyes: Pupils are equal, round, and reactive to light  EOM are normal    Neck: Normal range of motion  Neck supple  No JVD present  No thyromegaly present  Cardiovascular: Normal rate, regular rhythm, normal heart sounds and intact distal pulses  Exam reveals no gallop  No murmur heard  Pulmonary/Chest: Effort normal and breath sounds normal  No respiratory distress  She has no wheezes  She has no rales  Abdominal: Soft  Bowel sounds are normal  She exhibits no distension and no mass  There is no tenderness  Musculoskeletal: Normal range of motion  She exhibits no tenderness  Chest pain reproduced with costochondral pressure and palpation   Lymphadenopathy:     She has no cervical adenopathy  Neurological: She is alert and oriented to person, place, and time  No cranial nerve deficit  Coordination normal    Skin: No rash noted  Psychiatric: She has a normal mood and affect  Her behavior is normal  Judgment and thought content normal    Vitals reviewed

## 2020-01-02 ENCOUNTER — OFFICE VISIT (OUTPATIENT)
Dept: INTERNAL MEDICINE CLINIC | Age: 25
End: 2020-01-02
Payer: COMMERCIAL

## 2020-01-02 VITALS
DIASTOLIC BLOOD PRESSURE: 70 MMHG | BODY MASS INDEX: 36.88 KG/M2 | TEMPERATURE: 98.1 F | HEART RATE: 84 BPM | HEIGHT: 67 IN | SYSTOLIC BLOOD PRESSURE: 110 MMHG | WEIGHT: 235 LBS | OXYGEN SATURATION: 98 %

## 2020-01-02 DIAGNOSIS — R23.8 PIMPLES: ICD-10-CM

## 2020-01-02 DIAGNOSIS — E66.09 CLASS 2 OBESITY DUE TO EXCESS CALORIES WITHOUT SERIOUS COMORBIDITY WITH BODY MASS INDEX (BMI) OF 36.0 TO 36.9 IN ADULT: ICD-10-CM

## 2020-01-02 PROBLEM — E66.812 CLASS 2 OBESITY DUE TO EXCESS CALORIES WITHOUT SERIOUS COMORBIDITY WITH BODY MASS INDEX (BMI) OF 36.0 TO 36.9 IN ADULT: Status: ACTIVE | Noted: 2017-08-07

## 2020-01-02 PROBLEM — R07.89 COSTOCHONDRAL CHEST PAIN: Status: RESOLVED | Noted: 2019-12-04 | Resolved: 2020-01-02

## 2020-01-02 PROBLEM — R07.9 CHEST PAIN: Status: RESOLVED | Noted: 2019-12-04 | Resolved: 2020-01-02

## 2020-01-02 PROCEDURE — 1036F TOBACCO NON-USER: CPT | Performed by: INTERNAL MEDICINE

## 2020-01-02 PROCEDURE — 99213 OFFICE O/P EST LOW 20 MIN: CPT | Performed by: INTERNAL MEDICINE

## 2020-01-02 PROCEDURE — 3008F BODY MASS INDEX DOCD: CPT | Performed by: INTERNAL MEDICINE

## 2020-01-02 NOTE — PATIENT INSTRUCTIONS
Obesity   AMBULATORY CARE:   Obesity  is when your body mass index (BMI) is greater than 30  Your healthcare provider will use your height and weight to measure your BMI  The risks of obesity include  many health problems, such as injuries or physical disability  You may need tests to check for the following:  · Diabetes     · High blood pressure or high cholesterol     · Heart disease     · Gallbladder or liver disease     · Cancer of the colon, breast, prostate, liver, or kidney     · Sleep apnea     · Arthritis or gout  Seek care immediately if:   · You have a severe headache, confusion, or difficulty speaking  · You have weakness on one side of your body  · You have chest pain, sweating, or shortness of breath  Contact your healthcare provider if:   · You have symptoms of gallbladder or liver disease, such as pain in your upper abdomen  · You have knee or hip pain and discomfort while walking  · You have symptoms of diabetes, such as intense hunger and thirst, and frequent urination  · You have symptoms of sleep apnea, such as snoring or daytime sleepiness  · You have questions or concerns about your condition or care  Treatment for obesity  focuses on helping you lose weight to improve your health  Even a small decrease in BMI can reduce the risk for many health problems  Your healthcare provider will help you set a weight-loss goal   · Lifestyle changes  are the first step in treating obesity  These include making healthy food choices and getting regular physical activity  Your healthcare provider may suggest a weight-loss program that involves coaching, education, and therapy  · Medicine  may help you lose weight when it is used with a healthy diet and physical activity  · Surgery  can help you lose weight if you are very obese and have other health problems  There are several types of weight-loss surgery  Ask your healthcare provider for more information    Be successful losing weight:   · Set small, realistic goals  An example of a small goal is to walk for 20 minutes 5 days a week  Anther goal is to lose 5% of your body weight  · Tell friends, family members, and coworkers about your goals  and ask for their support  Ask a friend to lose weight with you, or join a weight-loss support group  · Identify foods or triggers that may cause you to overeat , and find ways to avoid them  Remove tempting high-calorie foods from your home and workplace  Place a bowl of fresh fruit on your kitchen counter  If stress causes you to eat, then find other ways to cope with stress  · Keep a diary to track what you eat and drink  Also write down how many minutes of physical activity you do each day  Weigh yourself once a week and record it in your diary  Eating changes: You will need to eat 500 to 1,000 fewer calories each day than you currently eat to lose 1 to 2 pounds a week  The following changes will help you cut calories:  · Eat smaller portions  Use small plates, no larger than 9 inches in diameter  Fill your plate half full of fruits and vegetables  Measure your food using measuring cups until you know what a serving size looks like  · Eat 3 meals and 1 or 2 snacks each day  Plan your meals in advance  Violette Dirk and eat at home most of the time  Eat slowly  · Eat fruits and vegetables at every meal   They are low in calories and high in fiber, which makes you feel full  Do not add butter, margarine, or cream sauce to vegetables  Use herbs to season steamed vegetables  · Eat less fat and fewer fried foods  Eat more baked or grilled chicken and fish  These protein sources are lower in calories and fat than red meat  Limit fast food  Dress your salads with olive oil and vinegar instead of bottled dressing  · Limit the amount of sugar you eat  Do not drink sugary beverages  Limit alcohol  Activity changes:  Physical activity is good for your body in many ways   It helps you burn calories and build strong muscles  It decreases stress and depression, and improves your mood  It can also help you sleep better  Talk to your healthcare provider before you begin an exercise program   · Exercise for at least 30 minutes 5 days a week  Start slowly  Set aside time each day for physical activity that you enjoy and that is convenient for you  It is best to do both weight training and an activity that increases your heart rate, such as walking, bicycling, or swimming  · Find ways to be more active  Do yard work and housecleaning  Walk up the stairs instead of using elevators  Spend your leisure time going to events that require walking, such as outdoor festivals or fairs  This extra physical activity can help you lose weight and keep it off  Follow up with your healthcare provider as directed: You may need to meet with a dietitian  Write down your questions so you remember to ask them during your visits  © 2017 [address] [employer] Information is for End User's use only and may not be sold, redistributed or otherwise used for commercial purposes  All illustrations and images included in CareNotes® are the copyrighted property of [employer]  or Jeff Yanes  The above information is an  only  It is not intended as medical advice for individual conditions or treatments  Talk to your doctor, nurse or pharmacist before following any medical regimen to see if it is safe and effective for you  Heart Healthy Diet   AMBULATORY CARE:   A heart healthy diet  is an eating plan low in total fat, unhealthy fats, and sodium (salt)  A heart healthy diet helps decrease your risk for heart disease and stroke  Limit the amount of fat you eat to 25% to 35% of your total daily calories  Limit sodium to less than 2,300 mg each day  Healthy fats:  Healthy fats can help improve cholesterol levels   The risk for heart disease is decreased when cholesterol levels are normal  Choose healthy fats, such as the following:  · Unsaturated fat  is found in foods such as soybean, canola, olive, corn, and safflower oils  It is also found in soft tub margarine that is made with liquid vegetable oil  · Omega-3 fat  is found in certain fish, such as salmon, tuna, and trout, and in walnuts and flaxseed  Unhealthy fats:  Unhealthy fats can cause unhealthy cholesterol levels in your blood and increase your risk of heart disease  Limit unhealthy fats, such as the following:  · Cholesterol  is found in animal foods, such as eggs and lobster, and in dairy products made from whole milk  Limit cholesterol to less than 300 milligrams (mg) each day  You may need to limit cholesterol to 200 mg each day if you have heart disease  · Saturated fat  is found in meats, such as seaman and hamburger  It is also found in chicken or turkey skin, whole milk, and butter  Limit saturated fat to less than 7% of your total daily calories  Limit saturated fat to less than 6% if you have heart disease or are at increased risk for it  · Trans fat  is found in packaged foods, such as potato chips and cookies  It is also in hard margarine, some fried foods, and shortening  Avoid trans fats as much as possible    Heart healthy foods and drinks to include:  Ask your dietitian or healthcare provider how many servings to have from each of the following food groups:  · Grains:      ¨ Whole-wheat breads, cereals, and pastas, and brown rice    ¨ Low-fat, low-sodium crackers and chips    · Vegetables:      ¨ Broccoli, green beans, green peas, and spinach    ¨ Collards, kale, and lima beans    ¨ Carrots, sweet potatoes, tomatoes, and peppers    ¨ Canned vegetables with no salt added    · Fruits:      ¨ Bananas, peaches, pears, and pineapple    ¨ Grapes, raisins, and dates    ¨ Oranges, tangerines, grapefruit, orange juice, and grapefruit juice    ¨ Apricots, mangoes, melons, and papaya    ¨ Raspberries and strawberries    ¨ Canned fruit with no added sugar    · Low-fat dairy products:      ¨ Nonfat (skim) milk, 1% milk, and low-fat almond, cashew, or soy milks fortified with calcium    ¨ Low-fat cheese, regular or frozen yogurt, and cottage cheese    · Meats and proteins , such as lean cuts of beef and pork (loin, leg, round), skinless chicken and turkey, legumes, soy products, egg whites, and nuts  Foods and drinks to limit or avoid:  Ask your dietitian or healthcare provider about these and other foods that are high in unhealthy fat, sodium, and sugar:  · Snack or packaged foods , such as frozen dinners, cookies, macaroni and cheese, and cereals with more than 300 mg of sodium per serving    · Canned or dry mixes  for cakes, soups, sauces, or gravies    · Vegetables with added sodium , such as instant potatoes, vegetables with added sauces, or regular canned vegetables    · Other foods high in sodium , such as ketchup, barbecue sauce, salad dressing, pickles, olives, soy sauce, and miso    · High-fat dairy foods  such as whole or 2% milk, cream cheese, or sour cream, and cheeses     · High-fat protein foods  such as high-fat cuts of beef (T-bone steaks, ribs), chicken or turkey with skin, and organ meats, such as liver    · Cured or smoked meats , such as hot dogs, seaman, and sausage    · Unhealthy fats and oils , such as butter, stick margarine, shortening, and cooking oils such as coconut or palm oil    · Food and drinks high in sugar , such as soft drinks (soda), sports drinks, sweetened tea, candy, cake, cookies, pies, and doughnuts  Other diet guidelines to follow:   · Eat more foods containing omega-3 fats  Eat fish high in omega-3 fats at least 2 times a week  · Limit alcohol  Too much alcohol can damage your heart and raise your blood pressure  Women should limit alcohol to 1 drink a day  Men should limit alcohol to 2 drinks a day   A drink of alcohol is 12 ounces of beer, 5 ounces of wine, or 1½ ounces of liquor  · Choose low-sodium foods  High-sodium foods can lead to high blood pressure  Add little or no salt to food you prepare  Use herbs and spices in place of salt  · Eat more fiber  to help lower cholesterol levels  Eat at least 5 servings of fruits and vegetables each day  Eat 3 ounces of whole-grain foods each day  Legumes (beans) are also a good source of fiber  Lifestyle guidelines:   · Do not smoke  Nicotine and other chemicals in cigarettes and cigars can cause lung and heart damage  Ask your healthcare provider for information if you currently smoke and need help to quit  E-cigarettes or smokeless tobacco still contain nicotine  Talk to your healthcare provider before you use these products  · Exercise regularly  to help you maintain a healthy weight and improve your blood pressure and cholesterol levels  Ask your healthcare provider about the best exercise plan for you  Do not start an exercise program without asking your healthcare provider  Follow up with your healthcare provider as directed:  Write down your questions so you remember to ask them during your visits  © 2017 [address] [facility] Information is for End User's use only and may not be sold, redistributed or otherwise used for commercial purposes  All illustrations and images included in CareNotes® are the copyrighted property of A [employer] M [employer]  or Jeff Yanes  The above information is an  only  It is not intended as medical advice for individual conditions or treatments  Talk to your doctor, nurse or pharmacist before following any medical regimen to see if it is safe and effective for you  Calorie Counting Diet   WHAT YOU NEED TO KNOW:   What is a calorie counting diet? It is a meal plan based on counting calories each day to reach a healthy body weight  You will need to eat fewer calories if you are trying to lose weight   Weight loss may decrease your risk for certain health problems or improve your health if you have health problems  Some of these health problems include heart disease, high blood pressure, and diabetes  What foods should I avoid? Your dietitian will tell you if you need to avoid certain foods based on your body weight and health condition  You may need to avoid high-fat foods if you are at risk for or have heart disease  You may need to eat fewer foods from the breads and starches food group if you have diabetes  How many calories are in foods? The following is a list of foods and drinks with the approximate number of calories in each  Check the food label to find the exact number of calories  A dietitian can tell you how many calories you should have from each food group each day    · Carbohydrate:      ¨ ½ of a 3-inch bagel, 1 slice of bread, or ½ of a hamburger bun or hot dog bun (80)    ¨ 1 (8-inch) flour tortilla or ½ cup of cooked rice (100)    ¨ 1 (6-inch) corn tortilla (80)    ¨ 1 (6-inch) pancake or 1 cup of bran flakes cereal (110)    ¨ ½ cup of cooked cereal (80)    ¨ ½ cup of cooked pasta (85)    ¨ 1 ounce of pretzels (100)    ¨ 3 cups of air-popped popcorn without butter or oil (80)    · Dairy:      ¨ 1 cup of skim or 1% milk (90)    ¨ 1 cup of 2% milk (120)    ¨ 1 cup of whole milk (160)    ¨ 1 cup of 2% chocolate milk (220)    ¨ 1 ounce of low-fat cheese with 3 grams of fat per ounce (70)    ¨ 1 ounce of cheddar cheese (114)    ¨ ½ cup of 1% fat cottage cheese (80)    ¨ 1 cup of plain or sugar-free, fat-free yogurt (90)    · Protein foods:      ¨ 3 ounces of fish (not breaded or fried) (95)    ¨ 3 ounces of breaded, fried fish (195)    ¨ ¾ cup of tuna canned in water (105)    ¨ 3 ounces of chicken breast without skin (105)    ¨ 1 fried chicken breast with skin (350)    ¨ ¼ cup of fat free egg substitute (40)    ¨ 1 large egg (75)    ¨ 3 ounces of lean beef or pork (165)    ¨ 3 ounces of fried pork chop or ham (185)    ¨ ½ cup of cooked dried beans, such as kidney, avilez, lentils, or navy (115)    ¨ 3 ounces of bologna or lunch meat (225)    ¨ 2 links of breakfast sausage (140)    · Vegetables:      ¨ ½ cup of sliced mushrooms (10)    ¨ 1 cup of salad greens, such as lettuce, spinach, or fabricio (15)    ¨ ½ cup of steamed asparagus (20)    ¨ ½ cup of cooked summer squash, zucchini squash, or green or wax beans (25)    ¨ 1 cup of broccoli or cauliflower florets, or 1 medium tomato (25)    ¨ 1 large raw carrot or ½ cup of cooked carrots (40)    ¨ ? of a medium cucumber or 1 stalk of celery (5)    ¨ 1 small baked potato (160)    ¨ 1 cup of breaded, fried vegetables (230)    · Fruit:      ¨ 1 (6-inch) banana (55)     ¨ ½ of a 4-inch grapefruit (55)    ¨ 15 grapes (60)    ¨ 1 medium orange or apple (70)    ¨ 1 large peach (65)    ¨ 1 cup of fresh pineapple chunks (75)    ¨ 1 cup of melon cubes (50)    ¨ 1¼ cups of whole strawberries (45)    ¨ ½ cup of fruit canned in juice (55)    ¨ ½ cup of fruit canned in heavy syrup (110)    ¨ ?  cup of raisins (130)    ¨ ½ cup of unsweetened fruit juice (60)    ¨ ½ cup of grape, cranberry, or prune juice (90)    · Fat:      ¨ 10 peanuts or 2 teaspoons of peanut butter (55)    ¨ 2 tablespoons of avocado or 1 tablespoon of regular salad dressing (45)    ¨ 2 slices of seaman (90)    ¨ 1 teaspoon of oil, such as safflower, canola, corn, or olive oil (45)    ¨ 2 teaspoons of low-fat margarine, or 1 tablespoon of low-fat mayonnaise (50)    ¨ 1 teaspoon of regular margarine (40)    ¨ 1 tablespoon of regular mayonnaise (135)    ¨ 1 tablespoon of cream cheese or 2 tablespoons of low-fat cream cheese (45)    ¨ 2 tablespoons of vegetable shortening (215)    · Dessert and sweets:      ¨ 8 animal crackers or 5 vanilla wafers (80)    ¨ 1 frozen fruit juice bar (80)    ¨ ½ cup of ice milk or low-fat frozen yogurt (90)    ¨ ½ cup of sherbet or sorbet (125)    ¨ ½ cup of sugar-free pudding or custard (60)    ¨ ½ cup of ice cream (140)    ¨ ½ cup of pudding or custard (175)    ¨ 1 (2-inch) square chocolate brownie (185)    · Combination foods:      ¨ Bean burrito made with an 8-inch tortilla, without cheese (275)    ¨ Chicken breast sandwich with lettuce and tomato (325)    ¨ 1 cup of chicken noodle soup (60)    ¨ 1 beef taco (175)    ¨ Regular hamburger with lettuce and tomato (310)    ¨ Regular cheeseburger with lettuce and tomato (410)     ¨ ¼ of a 12-inch cheese pizza (280)    ¨ Fried fish sandwich with lettuce and tomato (425)    ¨ Hot dog and bun (275)    ¨ 1½ cups of macaroni and cheese (310)    ¨ Taco salad with a fried tortilla shell (870)    · Low-calorie foods:      ¨ 1 tablespoon of ketchup or 1 tablespoon of fat free sour cream (15)    ¨ 1 teaspoon of mustard (5)    ¨ ¼ cup of salsa (20)    ¨ 1 large dill pickle (15)    ¨ 1 tablespoon of fat free salad dressing (10)    ¨ 2 teaspoons of low-sugar, light jam or jelly, or 1 tablespoon of sugar-free syrup (15)    ¨ 1 sugar-free popsicle (15)    ¨ 1 cup of club soda, seltzer water, or diet soda (0)  CARE AGREEMENT:   You have the right to help plan your care  Discuss treatment options with your caregivers to decide what care you want to receive  You always have the right to refuse treatment  The above information is an  only  It is not intended as medical advice for individual conditions or treatments  Talk to your doctor, nurse or pharmacist before following any medical regimen to see if it is safe and effective for you  © 2017 [address] Information is for End User's use only and may not be sold, redistributed or otherwise used for commercial purposes  All illustrations and images included in CareNotes® are the copyrighted property of A D A M [employer]  or Jeff Yanes

## 2020-01-02 NOTE — PROGRESS NOTES
Assessment/Plan:    Pimples  Patient is here because she knows what she thought black and blue valeriano under her right breast   Today she states that is improved  On exam looks like a resolving pimple and no Rx was given    Class 2 obesity due to excess calories without serious comorbidity with body mass index (BMI) of 36 0 to 36 9 in adult  Patient continues to slowly gain weight and again was counseled on diet and exercise for same       Diagnoses and all orders for this visit:    BMI 36 0-36 9,adult          Subjective:      Patient ID: Can Ochoa is a 21 y o  female  Patient noticed a marked under her right breast for the for all eyes yesterday became concerned  She had no history of trauma and it was not painful  However was mildly raise  Today she confirms that is slowly improving on its own without any treatment  She had no fever tenderness or drainage  Review of Systems   Constitutional: Negative for chills, fatigue, fever and unexpected weight change  HENT: Negative for congestion, ear pain, hearing loss, postnasal drip, sinus pressure, sore throat, trouble swallowing and voice change  Eyes: Negative for visual disturbance  Respiratory: Negative for cough, chest tightness, shortness of breath and wheezing  Cardiovascular: Negative for chest pain, palpitations and leg swelling  Gastrointestinal: Negative for abdominal distention, abdominal pain, anal bleeding, blood in stool, constipation, diarrhea and nausea  Endocrine: Negative for cold intolerance, polydipsia, polyphagia and polyuria  Genitourinary: Negative for dysuria, flank pain, frequency, hematuria and urgency  Musculoskeletal: Negative for arthralgias, back pain, gait problem, joint swelling, myalgias and neck pain  Skin: Negative for rash  Allergic/Immunologic: Negative for immunocompromised state     Neurological: Negative for dizziness, syncope, facial asymmetry, weakness, light-headedness, numbness and headaches  Hematological: Negative for adenopathy  Psychiatric/Behavioral: Negative for confusion, sleep disturbance and suicidal ideas  Objective:      /70 (BP Location: Left arm, Patient Position: Sitting)   Pulse 84   Temp 98 1 °F (36 7 °C) (Tympanic)   Ht 5' 7" (1 702 m)   Wt 107 kg (235 lb)   SpO2 98%   BMI 36 81 kg/m²          Physical Exam   Constitutional: She is oriented to person, place, and time  She appears well-developed and well-nourished  No distress  Ob   HENT:   Right Ear: External ear normal    Left Ear: External ear normal    Nose: Nose normal    Mouth/Throat: Oropharynx is clear and moist  No oropharyngeal exudate  Eyes: Pupils are equal, round, and reactive to light  EOM are normal    Neck: Normal range of motion  Neck supple  No JVD present  No thyromegaly present  Cardiovascular: Normal rate, regular rhythm, normal heart sounds and intact distal pulses  Exam reveals no gallop  No murmur heard  Pulmonary/Chest: Effort normal and breath sounds normal  No respiratory distress  She has no wheezes  She has no rales  Abdominal: Soft  Bowel sounds are normal  She exhibits no distension and no mass  There is no tenderness  Musculoskeletal: Normal range of motion  She exhibits no tenderness  Lymphadenopathy:     She has no cervical adenopathy  Neurological: She is alert and oriented to person, place, and time  No cranial nerve deficit  Coordination normal    Skin: No rash noted  Probable small pimple that is resolving where the underwire lies  Psychiatric: She has a normal mood and affect  Her behavior is normal  Judgment and thought content normal    Vitals reviewed  BMI Counseling: Body mass index is 36 81 kg/m²  The BMI is above normal  Nutrition recommendations include decreasing overall calorie intake

## 2020-01-02 NOTE — ASSESSMENT & PLAN NOTE
Patient is here because she knows what she thought black and blue valeriano under her right breast   Today she states that is improved    On exam looks like a resolving pimple and no Rx was given

## 2020-03-24 ENCOUNTER — TELEMEDICINE (OUTPATIENT)
Dept: INTERNAL MEDICINE CLINIC | Age: 25
End: 2020-03-24
Payer: COMMERCIAL

## 2020-03-24 DIAGNOSIS — N61.1 BREAST ABSCESS OF FEMALE: Primary | ICD-10-CM

## 2020-03-24 PROCEDURE — 99213 OFFICE O/P EST LOW 20 MIN: CPT | Performed by: INTERNAL MEDICINE

## 2020-03-24 NOTE — PROGRESS NOTES
Virtual Regular Visit    Problem List Items Addressed This Visit        Other    Breast abscess of female - Primary     Patient states that the lesion never went away from January and that it has not really changed  She has no systemic symptoms and does not hurt and has not gotten bigger  Will refer to Dr Lashell Concepcion for an opinion                    Reason for visit is a red lump under her breast    Encounter provider Karl Sterling MD    Provider located at [address] [place] [id number]      Recent Visits  No visits were found meeting these conditions  Showing recent visits within past 7 days and meeting all other requirements     Future Appointments  No visits were found meeting these conditions  Showing future appointments within next 150 days and meeting all other requirements        After connecting through [employer], the patient was identified by name and date of birth  Gerber Ortiz was informed that this is a telemedicine visit and that the visit is being conducted through telephone which may not be secure and therefore, might not be HIPAA-compliant  My office door was closed  No one else was in the room  She acknowledged consent and understanding of privacy and security of the video platform  The patient has agreed to participate and understands they can discontinue the visit at any time  Subjective  Gerber Ortiz is a 21 y o  female in good health who was here in January complaining of what she thought was a pimple under her right breast for her bra sits  She was given an antibiotic    She states that never really went away but that it does not hurt, has not gotten bigger and she has had no fever      Past Medical History:   Diagnosis Date    ECM (erythema chronicum migrans) 06/17/2015    Per [employer]       Past Surgical History:   Procedure Laterality Date    TOOTH EXTRACTION         Current Outpatient Medications Medication Sig Dispense Refill    desogestrel-ethinyl estradiol (APRI) 0 15-30 MG-MCG per tablet Take 1 tablet by mouth daily      fexofenadine (ALLEGRA) 180 MG tablet Take 1 tablet (180 mg total) by mouth daily 30 tablet 1    naproxen (NAPROSYN) 375 mg tablet Take 1 tablet (375 mg total) by mouth 2 (two) times a day with meals 60 tablet 1    pantoprazole (PROTONIX) 20 mg tablet Take 1 tablet (20 mg total) by mouth daily before breakfast 30 tablet 3     No current facility-administered medications for this visit  Allergies   Allergen Reactions    Sulfamethoxazole-Trimethoprim Rash       Review of Systems   Constitutional: Negative for chills, fatigue, fever and unexpected weight change  HENT: Negative for congestion, ear pain, hearing loss, postnasal drip, sinus pressure, sore throat, trouble swallowing and voice change  Eyes: Negative for visual disturbance  Respiratory: Negative for cough, chest tightness, shortness of breath and wheezing  Cardiovascular: Negative for chest pain, palpitations and leg swelling  Gastrointestinal: Negative for abdominal distention, abdominal pain, anal bleeding, blood in stool, constipation, diarrhea and nausea  Endocrine: Negative for cold intolerance, polydipsia, polyphagia and polyuria  Genitourinary: Negative for dysuria, flank pain, frequency, hematuria and urgency  Musculoskeletal: Negative for arthralgias, back pain, gait problem, joint swelling, myalgias and neck pain  Skin: Negative for rash  red pimple like lesion under right breast   Allergic/Immunologic: Negative for immunocompromised state  Neurological: Negative for dizziness, syncope, facial asymmetry, weakness, light-headedness, numbness and headaches  Hematological: Negative for adenopathy  Psychiatric/Behavioral: Negative for confusion, sleep disturbance and suicidal ideas  I spent15 minutes with the patient during this visit

## 2020-03-24 NOTE — ASSESSMENT & PLAN NOTE
Patient states that the lesion never went away from January and that it has not really changed  She has no systemic symptoms and does not hurt and has not gotten bigger    Will refer to Dr Jan Winchester for an opinion

## 2020-03-29 ENCOUNTER — APPOINTMENT (OUTPATIENT)
Dept: URGENT CARE | Age: 25
End: 2020-03-29
Payer: OTHER MISCELLANEOUS

## 2020-03-29 ENCOUNTER — TRANSCRIBE ORDERS (OUTPATIENT)
Dept: URGENT CARE | Age: 25
End: 2020-03-29

## 2020-03-29 ENCOUNTER — APPOINTMENT (OUTPATIENT)
Dept: URGENT CARE | Age: 25
End: 2020-03-29
Attending: PHYSICIAN ASSISTANT
Payer: OTHER MISCELLANEOUS

## 2020-03-29 DIAGNOSIS — Z77.21 PERSONAL HISTORY OF EXPOSURE TO POTENTIALLY HAZARDOUS BODY FLUIDS: Primary | ICD-10-CM

## 2020-03-29 DIAGNOSIS — Z77.21 PERSONAL HISTORY OF EXPOSURE TO POTENTIALLY HAZARDOUS BODY FLUIDS: ICD-10-CM

## 2020-03-29 LAB
ALT SERPL W P-5'-P-CCNC: 15 U/L (ref 12–78)
HBV SURFACE AB SER-ACNC: 448.29 MIU/ML
HBV SURFACE AG SER QL: NORMAL
HCV AB SER QL: NORMAL

## 2020-03-29 PROCEDURE — G0382 LEV 3 HOSP TYPE B ED VISIT: HCPCS | Performed by: PHYSICIAN ASSISTANT

## 2020-03-29 PROCEDURE — 87340 HEPATITIS B SURFACE AG IA: CPT

## 2020-03-29 PROCEDURE — 87389 HIV-1 AG W/HIV-1&-2 AB AG IA: CPT

## 2020-03-29 PROCEDURE — 84460 ALANINE AMINO (ALT) (SGPT): CPT

## 2020-03-29 PROCEDURE — 86706 HEP B SURFACE ANTIBODY: CPT

## 2020-03-29 PROCEDURE — 86803 HEPATITIS C AB TEST: CPT

## 2020-03-29 PROCEDURE — 99283 EMERGENCY DEPT VISIT LOW MDM: CPT | Performed by: PHYSICIAN ASSISTANT

## 2020-03-30 LAB — HIV 1+2 AB+HIV1 P24 AG SERPL QL IA: NORMAL

## 2020-04-03 ENCOUNTER — APPOINTMENT (OUTPATIENT)
Dept: URGENT CARE | Age: 25
End: 2020-04-03

## 2020-06-16 PROCEDURE — 88304 TISSUE EXAM BY PATHOLOGIST: CPT | Performed by: PATHOLOGY

## 2020-06-17 ENCOUNTER — LAB REQUISITION (OUTPATIENT)
Dept: LAB | Facility: HOSPITAL | Age: 25
End: 2020-06-17
Payer: COMMERCIAL

## 2020-06-17 DIAGNOSIS — D48.5 NEOPLASM OF UNCERTAIN BEHAVIOR OF SKIN: ICD-10-CM

## 2021-02-23 ENCOUNTER — OCCMED (OUTPATIENT)
Dept: URGENT CARE | Age: 26
End: 2021-02-23
Payer: OTHER MISCELLANEOUS

## 2021-02-23 ENCOUNTER — APPOINTMENT (OUTPATIENT)
Dept: RADIOLOGY | Age: 26
End: 2021-02-23
Payer: OTHER MISCELLANEOUS

## 2021-02-23 DIAGNOSIS — T14.90XA INJURY: Primary | ICD-10-CM

## 2021-02-23 DIAGNOSIS — T14.90XA INJURY: ICD-10-CM

## 2021-02-23 PROCEDURE — 99283 EMERGENCY DEPT VISIT LOW MDM: CPT | Performed by: PHYSICIAN ASSISTANT

## 2021-02-23 PROCEDURE — 73030 X-RAY EXAM OF SHOULDER: CPT

## 2021-02-23 PROCEDURE — G0382 LEV 3 HOSP TYPE B ED VISIT: HCPCS | Performed by: PHYSICIAN ASSISTANT

## 2021-03-01 ENCOUNTER — APPOINTMENT (OUTPATIENT)
Dept: URGENT CARE | Age: 26
End: 2021-03-01
Payer: OTHER MISCELLANEOUS

## 2021-03-01 PROCEDURE — 99213 OFFICE O/P EST LOW 20 MIN: CPT | Performed by: PHYSICIAN ASSISTANT

## 2021-05-21 ENCOUNTER — OFFICE VISIT (OUTPATIENT)
Dept: INTERNAL MEDICINE CLINIC | Age: 26
End: 2021-05-21
Payer: COMMERCIAL

## 2021-05-21 VITALS
HEIGHT: 66 IN | TEMPERATURE: 97.5 F | HEART RATE: 98 BPM | SYSTOLIC BLOOD PRESSURE: 128 MMHG | BODY MASS INDEX: 39.95 KG/M2 | WEIGHT: 248.6 LBS | DIASTOLIC BLOOD PRESSURE: 78 MMHG | OXYGEN SATURATION: 98 %

## 2021-05-21 DIAGNOSIS — J06.9 UPPER RESPIRATORY TRACT INFECTION, UNSPECIFIED TYPE: ICD-10-CM

## 2021-05-21 PROBLEM — K21.9 GERD (GASTROESOPHAGEAL REFLUX DISEASE): Status: RESOLVED | Noted: 2019-12-04 | Resolved: 2021-05-21

## 2021-05-21 PROBLEM — K64.9 HEMORRHOIDS: Status: RESOLVED | Noted: 2017-11-14 | Resolved: 2021-05-21

## 2021-05-21 PROBLEM — N61.1 BREAST ABSCESS OF FEMALE: Status: RESOLVED | Noted: 2020-01-02 | Resolved: 2021-05-21

## 2021-05-21 LAB — SARS-COV-2 RNA RESP QL NAA+PROBE: NEGATIVE

## 2021-05-21 PROCEDURE — U0005 INFEC AGEN DETEC AMPLI PROBE: HCPCS | Performed by: INTERNAL MEDICINE

## 2021-05-21 PROCEDURE — U0003 INFECTIOUS AGENT DETECTION BY NUCLEIC ACID (DNA OR RNA); SEVERE ACUTE RESPIRATORY SYNDROME CORONAVIRUS 2 (SARS-COV-2) (CORONAVIRUS DISEASE [COVID-19]), AMPLIFIED PROBE TECHNIQUE, MAKING USE OF HIGH THROUGHPUT TECHNOLOGIES AS DESCRIBED BY CMS-2020-01-R: HCPCS | Performed by: INTERNAL MEDICINE

## 2021-05-21 PROCEDURE — 3725F SCREEN DEPRESSION PERFORMED: CPT | Performed by: INTERNAL MEDICINE

## 2021-05-21 PROCEDURE — 1036F TOBACCO NON-USER: CPT | Performed by: INTERNAL MEDICINE

## 2021-05-21 PROCEDURE — 99214 OFFICE O/P EST MOD 30 MIN: CPT | Performed by: INTERNAL MEDICINE

## 2021-05-21 PROCEDURE — 3008F BODY MASS INDEX DOCD: CPT | Performed by: INTERNAL MEDICINE

## 2021-05-21 RX ORDER — FEXOFENADINE HCL 180 MG/1
180 TABLET ORAL DAILY
Qty: 30 TABLET | Refills: 1 | Status: SHIPPED | OUTPATIENT
Start: 2021-05-21 | End: 2021-06-14

## 2021-05-21 RX ORDER — FLUTICASONE PROPIONATE 50 MCG
2 SPRAY, SUSPENSION (ML) NASAL DAILY
Qty: 9.9 ML | Refills: 3 | Status: SHIPPED | OUTPATIENT
Start: 2021-05-21

## 2021-05-21 RX ORDER — DEXTROMETHORPHAN HYDROBROMIDE AND PROMETHAZINE HYDROCHLORIDE 15; 6.25 MG/5ML; MG/5ML
5 SOLUTION ORAL 4 TIMES DAILY PRN
Qty: 118 ML | Refills: 1 | Status: SHIPPED | OUTPATIENT
Start: 2021-05-21 | End: 2021-05-28

## 2021-05-21 RX ORDER — AZITHROMYCIN 250 MG/1
TABLET, FILM COATED ORAL
Qty: 6 TABLET | Refills: 0 | Status: SHIPPED | OUTPATIENT
Start: 2021-05-21 | End: 2021-05-26

## 2021-05-21 NOTE — PATIENT INSTRUCTIONS
Cold Symptoms   AMBULATORY CARE:   Cold symptoms  include sneezing, dry throat, a stuffy nose, headache, watery eyes, and a cough  Your cough may be dry, or you may cough up mucus  You may also have muscle aches, joint pain, and tiredness  Rarely, you may have a fever  Cold symptoms occur from inflammation in your upper respiratory system caused by a virus  Most colds go away without treatment  Seek care immediately if:   · You have increased tiredness and weakness  · You are unable to eat  · Your heart is beating much faster than usual for you  · You see white spots in the back of your throat and your neck is swollen and sore to the touch  · You see pinpoint or larger reddish-purple dots on your skin  Contact your healthcare provider if:   · You have a fever higher than 102°F (38 9°C)  · You have new or worsening shortness of breath  · You have thick nasal drainage for more than 2 days  · Your symptoms do not improve or get worse within 5 days  · You have questions or concerns about your condition or care  Treatment for cold symptoms  may include NSAIDS to decrease muscle aches and fever  Cold medicines may also be given to decrease coughing, nasal stuffiness, sneezing, and a runny nose  Manage your cold symptoms: The following may help relieve cold symptoms, such as a dry throat and congestion:  · Gargle with mouthwash or warm salt water as directed  · Suck on throat lozenges or hard candy  · Use a cold or warm vaporizer or humidifier to ease your breathing  · Rest for at least 2 days and then as needed to decrease tiredness and weakness  · Use petroleum based jelly around your nostrils to decrease irritation from blowing your nose  · Drink plenty of liquids  Liquids will help thin and loosen thick mucus so you can cough it up  Liquids will also keep you hydrated   Ask your healthcare provider which liquids are best for you and how much to drink each day     Prevent the spread of germs  by washing your hands often  You can spread your cold germs to others for at least 3 days after your symptoms start  Do not share items, such as eating utensils  Cover your nose and mouth when you cough or sneeze using the crook of your elbow instead of your hands  Throw used tissues in the garbage  Do not smoke:  Smoking may worsen your symptoms and increase the length of time you feel sick  Talk with your healthcare provider if you need help to stop smoking  Follow up with your healthcare provider as directed:  Write down your questions so you remember to ask them during your visits  © Copyright [address] Information is for End User's use only and may not be sold, redistributed or otherwise used for commercial purposes  All illustrations and images included in CareNotes® are the copyrighted property of A D A M , Inc  or [facility][note 3] Deric Ramirez   The above information is an  only  It is not intended as medical advice for individual conditions or treatments  Talk to your doctor, nurse or pharmacist before following any medical regimen to see if it is safe and effective for you

## 2021-05-21 NOTE — PROGRESS NOTES
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 will send her for [facility] test and is start treatment for URI and allergies    BMI 40 0-44 9, adult ([facility] [address] )   She has gained considerable weight since last seen and was counseled on diet and exercise       Diagnoses and all orders for this visit:    Upper respiratory tract infection, unspecified type  -     azithromycin (ZITHROMAX) 250 mg tablet; Take 2 tablets today then 1 tablet daily x 4 days  -     Promethazine-DM (PHENERGAN-DM) 6 25-15 mg/5 mL oral syrup; Take 5 mL by mouth 4 (four) times a day as needed for cough for up to 7 days  -     fexofenadine (ALLEGRA) 180 MG tablet; Take 1 tablet (180 mg total) by mouth daily  -     fluticasone (FLONASE) 50 mcg/act nasal spray; 2 sprays into each nostril daily  -     Novel Coronavirus (COVID-19), PCR [facility] Collected at [facility] or [facility]; Future          Subjective:      Patient ID: Sangita Oseguera is a 31 y o  female  Cough  This is a new problem  The current episode started yesterday  The problem has been unchanged  The cough is productive of sputum  Associated symptoms include nasal congestion, postnasal drip, rhinorrhea and a sore throat  Pertinent negatives include no chest pain, chills, ear congestion, ear pain, fever, headaches, heartburn, myalgias, rash, shortness of breath or wheezing  Nothing aggravates the symptoms  She has tried rest (Allegra) for the symptoms  The treatment provided mild relief  Her past medical history is significant for environmental allergies  Sore Throat   Associated symptoms include coughing  Pertinent negatives include no abdominal pain, congestion, diarrhea, ear pain, headaches, neck pain, shortness of breath or trouble swallowing             Review of Systems   Constitutional: Negative for chills, fatigue, fever and unexpected weight change  HENT: Positive for postnasal drip, rhinorrhea and sore throat  Negative for congestion, ear pain, hearing loss, sinus pressure, trouble swallowing and voice change  Eyes: Negative for visual disturbance  Respiratory: Positive for cough  Negative for chest tightness, shortness of breath and wheezing  Cardiovascular: Negative for chest pain, palpitations and leg swelling  Gastrointestinal: Negative for abdominal distention, abdominal pain, anal bleeding, blood in stool, constipation, diarrhea, heartburn and nausea  Endocrine: Negative for cold intolerance, polydipsia, polyphagia and polyuria  Genitourinary: Negative for dysuria, flank pain, frequency, hematuria and urgency  Musculoskeletal: Negative for arthralgias, back pain, gait problem, joint swelling, myalgias and neck pain  Skin: Negative for rash  Allergic/Immunologic: Positive for environmental allergies  Negative for immunocompromised state  Neurological: Negative for dizziness, syncope, facial asymmetry, weakness, light-headedness, numbness and headaches  Hematological: Negative for adenopathy  Psychiatric/Behavioral: Negative for confusion, sleep disturbance and suicidal ideas  Objective:      /78 (BP Location: Left arm, Patient Position: Sitting, Cuff Size: Standard)   Pulse 98   Temp 97 5 °F (36 4 °C)   Ht 5' 6" (1 676 m)   Wt 113 kg (248 lb 9 6 oz)   SpO2 98%   BMI 40 13 kg/m²          Physical Exam  Constitutional:       Appearance: She is obese  HENT:      Right Ear: Tympanic membrane normal       Left Ear: Tympanic membrane normal       Nose: Congestion and rhinorrhea present  Mouth/Throat: Tonsils: No tonsillar exudate  Eyes:      Conjunctiva/sclera: Conjunctivae normal       Pupils: Pupils are equal, round, and reactive to light  Neck:      Musculoskeletal: Normal range of motion  Thyroid: No thyromegaly     Cardiovascular: Rate and Rhythm: Normal rate  Pulmonary:      Effort: Pulmonary effort is normal       Breath sounds: Normal breath sounds  Lymphadenopathy:      Cervical: No cervical adenopathy  Skin:     General: Skin is warm and dry  Neurological:      General: No focal deficit present     Psychiatric:         Mood and Affect: Mood normal

## 2021-05-21 NOTE — ASSESSMENT & PLAN NOTE
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

## 2021-06-12 DIAGNOSIS — J06.9 UPPER RESPIRATORY TRACT INFECTION, UNSPECIFIED TYPE: ICD-10-CM

## 2021-06-14 RX ORDER — FEXOFENADINE HCL 180 MG/1
TABLET ORAL
Qty: 30 TABLET | Refills: 1 | Status: SHIPPED | OUTPATIENT
Start: 2021-06-14 | End: 2021-07-15

## 2021-07-15 DIAGNOSIS — J06.9 UPPER RESPIRATORY TRACT INFECTION, UNSPECIFIED TYPE: ICD-10-CM

## 2021-07-15 RX ORDER — FEXOFENADINE HCL 180 MG/1
TABLET ORAL
Qty: 30 TABLET | Refills: 1 | Status: SHIPPED | OUTPATIENT
Start: 2021-07-15 | End: 2021-08-12

## 2021-07-31 ENCOUNTER — OFFICE VISIT (OUTPATIENT)
Dept: URGENT CARE | Age: 26
End: 2021-07-31
Payer: COMMERCIAL

## 2021-07-31 VITALS
HEIGHT: 66 IN | BODY MASS INDEX: 40.18 KG/M2 | TEMPERATURE: 98.6 F | OXYGEN SATURATION: 97 % | HEART RATE: 120 BPM | RESPIRATION RATE: 18 BRPM | WEIGHT: 250 LBS

## 2021-07-31 DIAGNOSIS — R19.7 DIARRHEA, UNSPECIFIED TYPE: ICD-10-CM

## 2021-07-31 DIAGNOSIS — R19.7 NAUSEA VOMITING AND DIARRHEA: Primary | ICD-10-CM

## 2021-07-31 DIAGNOSIS — R11.2 NAUSEA VOMITING AND DIARRHEA: Primary | ICD-10-CM

## 2021-07-31 PROCEDURE — 99213 OFFICE O/P EST LOW 20 MIN: CPT | Performed by: NURSE PRACTITIONER

## 2021-07-31 PROCEDURE — 87635 SARS-COV-2 COVID-19 AMP PRB: CPT | Performed by: NURSE PRACTITIONER

## 2021-07-31 NOTE — PATIENT INSTRUCTIONS
You have declined prescriptions for symptoms  You are to eat light foods, clear fluids  BRAT diet  Bananas, rice applesauce and toast  You appear to have COVID symptoms  You are to quarantine until your rest results are back  If they are + you will need to quarantine x 10 days from the start of your symptoms  You are to take vitamin C 1000 mg twice a day; vitamin D 2000 IU daily; zinc, tylenol, robitussin for cough, rest, fluids and lie on your stomach  You are to down load [employer] My Chart for your results  No work or school until covid results are back  Follow up with your PCP  Go to the ED if symptoms worsen  COVID-19 (Coronavirus Disease 2019)   WHAT YOU NEED TO KNOW:   Coronavirus disease 2019 (COVID-19) is the disease caused by a coronavirus first discovered in December 2019  Coronaviruses generally cause upper respiratory (nose, throat, and lung) infections, such as a cold  The new virus spreads quickly and easily  The virus can be spread starting 2 days before symptoms even begin  The virus has also changed into several new forms (called variants) since it was discovered  The variants may be more contagious (easily spread) than the original form  Some may also cause more severe illness than others  It is important to follow local, national, and worldwide measures to protect yourself and others from infection  DISCHARGE INSTRUCTIONS:   If you think you or someone you know may be infected:  Do the following to protect others:  · If emergency care is needed,  tell the  about the possible infection, or call ahead and tell the emergency department  · Call a healthcare provider  for instructions if symptoms are mild  Anyone who may be infected should not  arrive without calling first  The provider will need to protect staff members and other patients  · The person who may be infected needs to wear a face covering  while getting medical care   This will help lower the risk of infecting others  Coverings are not used for anyone who is younger than 2 years, has breathing problems, or cannot remove it  The provider can give you instructions for anyone who cannot wear a covering  Call your local emergency number (911 in the [address]) or an emergency department if:   · You have trouble breathing or shortness of breath at rest     · You have chest pain or pressure that lasts longer than 5 minutes  · You become confused or hard to wake  · Your lips or face are blue  · You have a fever of 104°F (40°C) or higher  Call your doctor if:   · You do not  have symptoms of COVID-19 but had close physical contact within 14 days with someone who tested positive  · You have questions or concerns about your condition or care  Medicines: You may need any of the following for mild symptoms:  · Decongestants  help reduce nasal congestion and help you breathe more easily  If you take decongestant pills, they may make you feel restless or cause problems with your sleep  Do not use decongestant sprays for more than a few days  · Cough suppressants  help reduce coughing  Ask your healthcare provider which type of cough medicine is best for you  · Acetaminophen  decreases pain and fever  It is available without a doctor's order  Ask how much to take and how often to take it  Follow directions  Read the labels of all other medicines you are using to see if they also contain acetaminophen, or ask your doctor or pharmacist  Acetaminophen can cause liver damage if not taken correctly  Do not use more than 4 grams (4,000 milligrams) total of acetaminophen in one day  · NSAIDs , such as ibuprofen, help decrease swelling, pain, and fever  NSAIDs can cause stomach bleeding or kidney problems in certain people  If you take blood thinner medicine, always ask your healthcare provider if NSAIDs are safe for you  Always read the medicine label and follow directions  · Take your medicine as directed    Contact your healthcare provider if you think your medicine is not helping or if you have side effects  Tell him or her if you are allergic to any medicine  Keep a list of the medicines, vitamins, and herbs you take  Include the amounts, and when and why you take them  Bring the list or the pill bottles to follow-up visits  Carry your medicine list with you in case of an emergency  How the 2019 coronavirus spreads: The following are ways the virus is thought to spread, but more information may be coming:  · Droplets are the main way all coronaviruses spread  The virus travels in droplets that form when a person talks, coughs, or sneezes  The droplets can also float in the air for minutes or hours  Infection happens when you breathe in the droplets or get them in your eyes or nose  Close personal contact with an infected person increases your risk for infection  This means being within 6 feet (2 meters) of the person for at least 15 minutes over 24 hours  · Person-to-person contact can spread the virus  For example, a person with the virus on his or her hands can spread it by shaking hands with someone  · The virus can stay on objects and surfaces for a short time  You may become infected by touching the object or surface and then touching your eyes or mouth  · An infected animal may be able to infect a person who touches it  This may happen at live markets or on a farm  Help lower the risk for COVID-19:  The best way to prevent infection is to avoid anyone who is infected, but this can be hard to do  An infected person can spread the virus before signs or symptoms begin, or even if signs or symptoms never develop  The following can help lower the risk for infection:      · Wash your hands often throughout the day  Use soap and water  Rub your soapy hands together, lacing your fingers, for at least 20 seconds  Rinse with warm, running water  Dry your hands with a clean towel or paper towel   Use hand  that contains alcohol if soap and water are not available  Teach children how to wash their hands and use hand   · Cover sneezes and coughs  Turn your face away and cover your mouth and nose with a tissue  Throw the tissue away  Use the bend of your arm if a tissue is not available  Then wash your hands well with soap and water or use hand   Teach children how to cover a cough or sneeze  · Wear a face covering (mask) around anyone who does not live in your home  Use a cloth covering with at least 2 layers  You can also create layers by putting a cloth covering over a disposable non-medical mask  Cover your mouth and your nose  The covering should fit snugly against the bridge of your nose  Securely fasten it under your chin and on the sides of your face  Do not  wear a plastic face shield instead of a covering  Continue social distancing and washing your hands often  A face covering is not a substitute for social distancing safety measures  · Follow worldwide, national, and local social distancing guidelines  Keep at least 6 feet (2 meters) between you and others  Also keep this distance from anyone who comes to your home, such as someone making a delivery  Wear a face covering while you are around others  You will need to wear a covering in restaurants, stores, and other public buildings  You will also need a covering on mass transit, such as a bus, subway, or airplane  Remember to use a covering made from thick material or wear 2 coverings together  · Make a habit of not touching your face  If you get the virus on your hands, you can transfer it to your eyes, nose, or mouth and become infected  You can also transfer it to objects, surfaces, or people  Do not touch your eyes, nose, or mouth without washing your hands first     · Clean and disinfect high-touch surfaces and objects often    Use disinfecting wipes, or make a solution of 4 teaspoons of bleach in 1 quart (4 cups) of water  Clean and disinfect even if you think no one living in or coming to your home is infected with the virus  · Ask about vaccines you may need  Get a COVID-19 vaccine when it is available to you  The current vaccines are given as a shot in 1 or 2 doses  Get the influenza (flu) vaccine as soon as recommended each year, usually starting in September or October  Get the pneumonia vaccine if recommended  Follow social distancing guidelines:  National and local social distancing rules vary  Rules may change over time as restrictions are lifted  Restrictions may return if an outbreak happens where you live  It is important to know and follow all current social distancing rules in your area  The following are general guidelines:  · Stay home if you are sick or think you may have COVID-19  It is important to stay home if you are waiting for a testing appointment or for test results  Even if you do not have symptoms, you can pass the virus to others  · Limit trips out of your home  Have food, medicines, and other supplies delivered and left at your door or other area, if possible  Plan trips out of your home so you make the fewest stops possible to limit close personal contact  Keep track of places you go  This will help contact tracers notify others if you become infected  · Avoid close physical contact with anyone who does not live in your home  Do not shake hands with, hug, or kiss a person as a greeting  If you must use public transportation (such as a bus or subway), try to sit or stand away from others  Only allow necessary people into your home  Wear your face covering, and remind others to wear a face covering  Remind them to wash their hands when they arrive and before they leave  Do not  let someone into your home or go to someone's home just to visit  Even if you both do not feel sick, the virus can pass from one of you to the other  · Avoid in-person gatherings and crowds  Gatherings or crowds of 10 or more individuals can cause the virus to spread  Avoid places such as o[place], beaches, sporting events, and tourist attractions  For events such as parties, holiday meals, Sikh services, and conferences, attend virtually (on a computer), if possible  · Ask your healthcare provider for other ways to have appointments  Some providers offer phone, video, or other types of appointments  You may also be able to get prescriptions for a few months of your medicines at a time  · Stay safe if you must go out to work  Keep physical distance between you and other workers as much as possible  Follow your employer's rules so everyone stays safe  If you have COVID-19 and are recovering at home,  healthcare providers will give you specific instructions to follow  The following are general guidelines to remind you how to keep others safe until you are well:  · Wash your hands often  Use soap and water as much as possible  Use hand  that contains alcohol if soap and water are not available  Dry your hands with a clean towel or paper towel  Do not share towels with anyone  If you use paper towels, throw them away in a lined trash can kept in your room or area  Use a covered trash can, if possible  · Do not go out of your home unless it is necessary  Ask someone who is not infected to go out for groceries or supplies, or have them delivered  Do not go to your healthcare provider's office without an appointment  · Only have close physical contact with a person giving direct care, or a baby or child you must care for  Family members and friends should not visit you  If possible, stay in a separate area or room of your home if you live with others  No one should go into the area or room except to give you care  You can visit with others by phone, video chat, e-mail, or similar systems  · Wear a face covering while others are near you    This can help prevent droplets from spreading the virus when you talk, sneeze, or cough  Put the covering on before anyone comes into your room or area  Remind the person to cover his or her nose and mouth before coming in to provide care for you  · Do not share items  Do not share dishes, towels, or other items with anyone  Items need to be washed after you use them  · Protect your baby  Some newborns have tested positive for the virus  It is not known if they became infected before or after birth  The highest risk is when a  has close contact with an infected person  If you are pregnant or breastfeeding, talk to your healthcare provider or obstetrician about any concerns you have  He or she will tell you when to bring your baby in for check-ups and vaccines  He or she will also tell you what to do if you think your baby was infected with the coronavirus  Wash your hands and put on a clean face covering before you breastfeed or care for your baby  · Do not handle live animals unless it is necessary  Some animals, including pets, have been infected with the new coronavirus  Ask someone who is not infected to take care of your pet until you are well  If you must care for a pet, wear a face covering  Wash your hands before and after you give care  Talk to your healthcare provider about how to keep a service animal safe, if needed  · Follow directions from your healthcare provider for being around others after you recover  It is not known if or for how long a recovered person can pass the virus to others  Your provider may give you instructions, such as continuing social distancing and wearing a face covering  He or she will tell you when it is okay to be around others again  This may be 10 to 20 days after symptoms started or you had a positive test  Most symptoms will also need to be gone  Your provider will give you more information      Follow up with your doctor as directed:  Write down your questions so you remember to ask them during your visits  For more information:   · Centers for Disease Control and Prevention  [address] , [address]  Phone: [phone]  Web Address: [employer]    © [facility] 2021 Information is for End User's use only and may not be sold, redistributed or otherwise used for commercial purposes  All illustrations and images included in CareNotes® are the copyrighted property of A D A M , Inc  or [address] [place] [address]  The above information is an  only  It is not intended as medical advice for individual conditions or treatments  Talk to your doctor, nurse or pharmacist before following any medical regimen to see if it is safe and effective for you  Acute Diarrhea   WHAT YOU NEED TO KNOW:   Acute diarrhea starts quickly and lasts a short time, usually 1 to 3 days  It can last up to 2 weeks  You may not be able to control your diarrhea  Acute diarrhea usually stops on its own  DISCHARGE INSTRUCTIONS:   Return to the emergency department if:   · You feel confused  · Your heartbeat is faster than usual      · Your eyes look deeply sunken, or you have no tears when you cry  · You urinate less than usual, or your urine is dark yellow  · You have blood or mucus in your bowel movements  · You have severe abdominal pain  · You are unable to drink any liquids  Contact your healthcare provider if:   · Your symptoms do not get better with treatment  · You have a fever higher than 101 3°F (38 5°C)  · You have trouble eating and drinking because you are vomiting  · Your diarrhea does not get better in 7 days  · You have questions or concerns about your condition or care  Follow up with your healthcare provider as directed:  Write down your questions so you remember to ask them during your visits  Medicines:  · Diarrhea medicine  is an over-the-counter medicine that helps slow or stop your diarrhea   Do not  take this medicine unless your healthcare provider says it is okay  · Antibiotics  may be given to help treat an infection caused by bacteria  · Antiparasitics  may be given to treat an infection caused by parasites  · Take your medicine as directed  Contact your healthcare provider if you think your medicine is not helping or if you have side effects  Tell him of her if you are allergic to any medicine  Keep a list of the medicines, vitamins, and herbs you take  Include the amounts, and when and why you take them  Bring the list or the pill bottles to follow-up visits  Carry your medicine list with you in case of an emergency  Self-care:   · Drink liquids as directed  Liquids will help prevent dehydration caused by diarrhea  Ask your healthcare provider how much liquid to drink each day and which liquids are best for you  You may need to drink an oral rehydration solution (ORS)  An ORS has the right amounts of water, salts, and sugar you need to replace body fluids  You can buy an ORS at most grocery stores and pharmacies  · Eat foods that are easy to digest   Examples include rice, lentils, cereal, bananas, potatoes, and bread  It also includes some fruits (bananas, melon), well-cooked vegetables, and lean meats  Do not eat foods high in fiber, fat, and sugar  Do not drink alcohol until your diarrhea is gone  Prevent acute diarrhea:   · Wash your hands often  Use soap and water  Wash your hands before you eat or prepare food  Also wash your hands after you use the bathroom  Use an alcohol-based hand gel when soap and water are not available  · Keep bathroom surfaces clean  This helps prevent the spread of germs that cause acute diarrhea  · Wash fruits and vegetables well before you eat them  This can help remove germs that cause diarrhea  If possible, remove the skin from fruits and vegetables, or cook them well before you eat them  · [employer] and poultry as directed    Meat includes beef and pork  Poultry includes chicken, turkey, and duck  ? Cook ground meat  to 160°F      ? Cook ground poultry, whole poultry, or cuts of poultry  to at least 165°F  Remove the poultry from heat  Let it stand for 3 minutes before you eat it  ? Cook whole cuts of meat other than poultry  to at least 145°F  Remove the meat from heat  Let it stand for 3 minutes before you eat it  · Wash dishes that have touched raw meat or poultry with hot water and soap  This includes cutting boards, utensils, dishes, and serving containers  · Place raw or cooked meat or poultry in the refrigerator as soon as possible  Bacteria can grow in meat or poultry that is left at room temperature too long  · Do not eat raw or undercooked oysters, clams, or mussels  These foods may be contaminated and cause infection  · Drink only filtered or treated water when you travel  Do not put ice in your drinks  Drink bottled water whenever possible  © Copyright [employer] 2021 Information is for End User's use only and may not be sold, redistributed or otherwise used for commercial purposes  All illustrations and images included in CareNotes® are the copyrighted property of A D A M , Inc  or [address] [place] [address]  The above information is an  only  It is not intended as medical advice for individual conditions or treatments  Talk to your doctor, nurse or pharmacist before following any medical regimen to see if it is safe and effective for you  Acute Nausea and Vomiting   WHAT YOU NEED TO KNOW:   Acute nausea and vomiting start suddenly, worsen quickly, and last a short time  DISCHARGE INSTRUCTIONS:   Return to the emergency department if:   · You see blood in your vomit or your bowel movements  · You have sudden, severe pain in your chest and upper abdomen after hard vomiting or retching      · You have swelling in your neck and chest      · You are dizzy, cold, and thirsty and your eyes and mouth are dry     · You are urinating very little or not at all  · You have muscle weakness, leg cramps, and trouble breathing  · Your heart is beating much faster than normal      · You continue to vomit for more than 48 hours  Contact your healthcare provider if:   · You have frequent dry heaves (vomiting but nothing comes out)  · Your nausea and vomiting does not get better or go away after you use medicine  · You have questions or concerns about your condition or treatment  Medicines: You may need any of the following:  · Medicines  may be given to calm your stomach and stop your vomiting  You may also need medicines to help you feel more relaxed or to stop nausea and vomiting caused by motion sickness  · Gastrointestinal stimulants  are used to help empty your stomach and bowels  This may help decrease nausea and vomiting  · Take your medicine as directed  Contact your healthcare provider if you think your medicine is not helping or if you have side effects  Tell him or her if you are allergic to any medicine  Keep a list of the medicines, vitamins, and herbs you take  Include the amounts, and when and why you take them  Bring the list or the pill bottles to follow-up visits  Carry your medicine list with you in case of an emergency  Prevent or manage acute nausea and vomiting:   · Do not drink alcohol  Alcohol may upset or irritate your stomach  Too much alcohol can also cause acute nausea and vomiting  · Control stress  Headaches due to stress may cause nausea and vomiting  Find ways to relax and manage your stress  Get more rest and sleep  · Drink more liquids as directed  Vomiting can lead to dehydration  It is important to drink more liquids to help replace lost body fluids  Ask your healthcare provider how much liquid to drink each day and which liquids are best for you  Your provider may recommend that you drink an oral rehydration solution (ORS)   ORS contains water, salts, and sugar that are needed to replace the lost body fluids  Ask what kind of ORS to use, how much to drink, and where to get it  · Eat smaller meals, more often  Eat small amounts of food every 2 to 3 hours, even if you are not hungry  Food in your stomach may decrease your nausea  · Talk to your healthcare provider before you take over-the-counter (OTC) medicines  These medicines can cause serious problems if you use certain other medicines, or you have a medical condition  You may have problems if you use too much or use them for longer than the label says  Follow directions on the label carefully  Follow up with your healthcare provider as directed:  Write down your questions so you remember to ask them during your follow-up visits  © Copyright [employer] 2021 Information is for End User's use only and may not be sold, redistributed or otherwise used for commercial purposes  All illustrations and images included in CareNotes® are the copyrighted property of A D A M , Inc  or [facility] Deric Ramirez   The above information is an  only  It is not intended as medical advice for individual conditions or treatments  Talk to your doctor, nurse or pharmacist before following any medical regimen to see if it is safe and effective for you

## 2021-07-31 NOTE — PROGRESS NOTES
[facility]        NAME: Damien Mederos is a 31 y o  female  : 1995    MRN: [id number]  DATE: 2021  TIME: 12:21 PM    Assessment and Plan   Nausea vomiting and diarrhea [R11 2, R19 7]  1  Nausea vomiting and diarrhea     2  Diarrhea, unspecified type  Novel Coronavirus (Covid-19),PCR [facility] - Office Collection         Patient Instructions       Follow up with PCP in 3-5 days  Proceed to  ER if symptoms worsen  You have declined prescriptions for symptoms  You are to eat light foods, clear fluids  BRAT diet  Bananas, rice applesauce and toast  You appear to have COVID symptoms  You are to quarantine until your rest results are back  If they are + you will need to quarantine x 10 days from the start of your symptoms  You are to take vitamin C 1000 mg twice a day; vitamin D 2000 IU daily; zinc, tylenol, robitussin for cough, rest, fluids and lie on your stomach  You are to down load [facility]lee My Chart for your results  No work or school until covid results are back  Follow up with your PCP  Go to the ED if symptoms worsen  Chief Complaint     Chief Complaint   Patient presents with    Diarrhea     Diarrhea, Vomiting, Nausea x 2 days         History of Present Illness       This is a 31year old female who states was feeling well, traveled to [facility] and developed loose stools while there  She states when she got back she had diarrhea and vomiting  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, 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 and has contacted her employer  She is requesting covid testing  Denies pregnancy   Denies fevers  Diarrhea   Associated symptoms include vomiting  Review of Systems   Review of Systems   Constitutional: Negative  HENT: Negative  Eyes: Negative  Respiratory: Negative  Cardiovascular: Negative  Gastrointestinal: Positive for diarrhea, nausea and vomiting  Endocrine: Negative  Genitourinary: Negative      Musculoskeletal: Negative  Skin: Negative  Allergic/Immunologic: Negative  Neurological: Negative  Hematological: Negative  Psychiatric/Behavioral: Negative  Current Medications       Current Outpatient Medications:     desogestrel-ethinyl estradiol (APRI) 0 15-30 MG-MCG per tablet, Take 1 tablet by mouth daily, Disp: , Rfl:     fexofenadine (ALLEGRA) 180 MG tablet, TAKE 1 TABLET BY MOUTH EVERY DAY, Disp: 30 tablet, Rfl: 1    fluticasone (FLONASE) 50 mcg/act nasal spray, 2 sprays into each nostril daily, Disp: 9 9 mL, Rfl: 3    Current Allergies     Allergies as of 07/31/2021 - Reviewed 07/31/2021   Allergen Reaction Noted    Sulfamethoxazole-trimethoprim Rash 04/06/2016            The following portions of the patient's history were reviewed and updated as appropriate: allergies, current medications, past family history, past medical history, past social history, past surgical history and problem list      Past Medical History:   Diagnosis Date    ECM (erythema chronicum migrans) 06/17/2015    Per Allscripts       Past Surgical History:   Procedure Laterality Date    TOOTH EXTRACTION         History reviewed  No pertinent family history  Medications have been verified  Objective   Pulse (!) 120   Temp 98 6 °F (37 °C)   Resp 18   Ht 5' 6" (1 676 m)   Wt 113 kg (250 lb)   SpO2 97%   BMI 40 35 kg/m²   No LMP recorded  Physical Exam     Physical Exam  Vitals and nursing note reviewed  Constitutional:       General: She is not in acute distress  Appearance: Normal appearance  She is obese  She is not ill-appearing, toxic-appearing or diaphoretic  HENT:      Head: Normocephalic and atraumatic  Right Ear: Tympanic membrane, ear canal and external ear normal       Left Ear: Tympanic membrane, ear canal and external ear normal       Nose: Nose normal       Mouth/Throat:      Mouth: Mucous membranes are moist    Eyes:      Extraocular Movements: Extraocular movements intact  Cardiovascular:      Rate and Rhythm: Normal rate and regular rhythm  Pulses: Normal pulses  Heart sounds: Normal heart sounds  Pulmonary:      Effort: Pulmonary effort is normal       Breath sounds: Normal breath sounds  Abdominal:      General: There is no distension  Palpations: Abdomen is soft  Tenderness: There is no abdominal tenderness  Musculoskeletal:         General: Normal range of motion  Cervical back: Normal range of motion and neck supple  Skin:     General: Skin is warm and dry  Capillary Refill: Capillary refill takes less than 2 seconds  Neurological:      General: No focal deficit present  Mental Status: She is alert and oriented to person, place, and time  Psychiatric:         Mood and Affect: Mood normal          Behavior: Behavior normal          Thought Content:  Thought content normal          Judgment: Judgment normal

## 2021-07-31 NOTE — LETTER
July 31, 2021     Patient: Frida Martel   YOB: 1995   Date of Visit: 7/31/2021       To Whom It May Concern: It is my medical opinion that [address] no work until [employer] are back  If you have any questions or concerns, please don't hesitate to call           Sincerely,        ELBA Castro    CC: No Recipients

## 2021-08-01 LAB — SARS-COV-2 RNA RESP QL NAA+PROBE: NEGATIVE

## 2021-08-12 DIAGNOSIS — J06.9 UPPER RESPIRATORY TRACT INFECTION, UNSPECIFIED TYPE: ICD-10-CM

## 2021-08-12 RX ORDER — FEXOFENADINE HCL 180 MG/1
TABLET ORAL
Qty: 30 TABLET | Refills: 1 | Status: SHIPPED | OUTPATIENT
Start: 2021-08-12 | End: 2021-08-30

## 2021-08-26 DIAGNOSIS — J06.9 UPPER RESPIRATORY TRACT INFECTION, UNSPECIFIED TYPE: ICD-10-CM

## 2021-08-30 RX ORDER — FEXOFENADINE HCL 180 MG/1
TABLET ORAL
Qty: 90 TABLET | Refills: 1 | Status: SHIPPED | OUTPATIENT
Start: 2021-08-30

## 2022-02-28 ENCOUNTER — VBI (OUTPATIENT)
Dept: ADMINISTRATIVE | Facility: OTHER | Age: 27
End: 2022-02-28

## 2022-08-04 ENCOUNTER — VBI (OUTPATIENT)
Dept: ADMINISTRATIVE | Facility: OTHER | Age: 27
End: 2022-08-04

## 2022-10-21 ENCOUNTER — TELEMEDICINE (OUTPATIENT)
Dept: INTERNAL MEDICINE CLINIC | Age: 27
End: 2022-10-21
Payer: COMMERCIAL

## 2022-10-21 ENCOUNTER — TELEPHONE (OUTPATIENT)
Dept: OTHER | Facility: OTHER | Age: 27
End: 2022-10-21

## 2022-10-21 DIAGNOSIS — U07.1 COVID-19: ICD-10-CM

## 2022-10-21 DIAGNOSIS — B34.9 VIRAL INFECTION, UNSPECIFIED: ICD-10-CM

## 2022-10-21 PROBLEM — J06.9 UPPER RESPIRATORY TRACT INFECTION: Status: RESOLVED | Noted: 2019-06-04 | Resolved: 2022-10-21

## 2022-10-21 PROCEDURE — 99213 OFFICE O/P EST LOW 20 MIN: CPT | Performed by: INTERNAL MEDICINE

## 2022-10-21 RX ORDER — NIRMATRELVIR AND RITONAVIR 300-100 MG
3 KIT ORAL 2 TIMES DAILY
Qty: 30 TABLET | Refills: 0 | Status: SHIPPED | OUTPATIENT
Start: 2022-10-21 | End: 2022-10-26

## 2022-10-21 NOTE — PROGRESS NOTES
COVID-19 Outpatient Progress Note    Assessment/Plan:    Problem List Items Addressed This Visit        Other    COVID-19      Other Visit Diagnoses     Viral infection, unspecified             Disposition:     I have spent 15 minutes directly with the patient  Greater than 50% of this time was spent in counseling/coordination of care regarding: diagnostic results, prognosis, risks and benefits of treatment options, instructions for management, importance of treatment compliance, risk factor reductions and impressions  Encounter provider: Estee Pittman MD     Provider located at: [address] [place] [address] [address] [facility]  [phone]     Recent Visits  No visits were found meeting these conditions  Showing recent visits within past 7 days and meeting all other requirements  Future Appointments  No visits were found meeting these conditions  Showing future appointments within next 150 days and meeting all other requirements     This virtual check-in was done via telephone and she agrees to proceed  Patient agrees to participate in a virtual check in via telephone or video visit instead of presenting to the office to address urgent/immediate medical needs  Patient is aware this is a billable service  She acknowledged consent and understanding of privacy and security of the video platform  The patient has agreed to participate and understands they can discontinue the visit at any time  After connecting through Telephone, the patient was identified by name and date of birth  Mike Agarwal was informed that this was a telemedicine visit and that the exam was being conducted confidentially over secure lines  My office door was closed  No one else was in the room  Mike Agarwal acknowledged consent and understanding of privacy and security of the telemedicine visit   I informed the patient that I have reviewed her record in Epic and presented the opportunity for her to ask any questions regarding the visit today  The patient agreed to participate  It was my intent to perform this visit via video technology but the patient was not able to do a video connection so the visit was completed via audio telephone only  Verification of patient location:  Patient is located in the following state in which I hold an active license: [place]    Subjective:   Mackenzie Mota is a 28 y o  female who is concerned about COVID-19  Patient's symptoms include fever, chills, fatigue, malaise, nasal congestion, rhinorrhea and cough  - Date of symptom onset: 10/19/2022      COVID-19 vaccination status: Fully vaccinated (primary series)    Exposure:   Contact with a person who is under investigation (PUI) for or who is positive for COVID-19 within the last 14 days?: No    Hospitalized recently for fever and/or lower respiratory symptoms?: No      Currently a healthcare worker that is involved in direct patient care?: No      Works in a special setting where the risk of COVID-19 transmission may be high? (this may include long-term care, correctional and skilled nursing facilities; homeless shelters; assisted-living facilities and group homes ): No      Resident in a special setting where the risk of COVID-19 transmission may be high? (this may include long-term care, correctional and skilled nursing facilities; homeless shelters; assisted-living facilities and group homes ): No      Lab Results   Component Value Date    SARSCOV2 Negative 07/31/2021    [facility] [address] [facility] Not Detected 09/27/2022       Review of Systems   Constitutional: Positive for chills, fatigue and fever  HENT: Positive for congestion and rhinorrhea  Respiratory: Positive for cough        Current Outpatient Medications on File Prior to Visit   Medication Sig   • desogestrel-ethinyl estradiol (APRI) 0 15-30 MG-MCG per tablet Take 1 tablet by mouth daily   • fexofenadine (ALLEGRA) 180 MG tablet TAKE 1 TABLET BY MOUTH EVERY DAY   • fluticasone (FLONASE) 50 mcg/act nasal spray 2 sprays into each nostril daily       Objective: There were no vitals taken for this visit       Physical Exam  Sonia Baires MD

## 2022-10-21 NOTE — TELEPHONE ENCOUNTER
Patient is requesting a call back from the office  She tested positive for covid today and is having a productive and non productive cough with a sore throat  She would like something prescribed   Please advise

## 2022-12-09 ENCOUNTER — VBI (OUTPATIENT)
Dept: ADMINISTRATIVE | Facility: OTHER | Age: 27
End: 2022-12-09

## 2023-01-03 ENCOUNTER — OFFICE VISIT (OUTPATIENT)
Dept: INTERNAL MEDICINE CLINIC | Age: 28
End: 2023-01-03

## 2023-01-03 VITALS
DIASTOLIC BLOOD PRESSURE: 78 MMHG | HEART RATE: 86 BPM | HEIGHT: 66 IN | WEIGHT: 254.8 LBS | TEMPERATURE: 97.8 F | OXYGEN SATURATION: 98 % | BODY MASS INDEX: 40.95 KG/M2 | SYSTOLIC BLOOD PRESSURE: 136 MMHG

## 2023-01-03 DIAGNOSIS — R05.9 COUGH IN ADULT: ICD-10-CM

## 2023-01-03 DIAGNOSIS — U07.1 COVID-19: ICD-10-CM

## 2023-01-03 DIAGNOSIS — R05.2 SUBACUTE COUGH: Primary | ICD-10-CM

## 2023-01-03 RX ORDER — ALBUTEROL SULFATE 90 UG/1
2 AEROSOL, METERED RESPIRATORY (INHALATION) EVERY 6 HOURS PRN
Qty: 6.7 G | Refills: 5 | Status: SHIPPED | OUTPATIENT
Start: 2023-01-03

## 2023-01-04 PROBLEM — R05.2 SUBACUTE COUGH: Status: ACTIVE | Noted: 2019-03-18

## 2023-01-04 NOTE — PROGRESS NOTES
Name: Babatunde Beard      : 1995      MRN: [id number]  Encounter Provider: Richi Patel MD  Encounter Date: 1/3/2023   Encounter department: [address] [place] [address]     1  Subacute cough  -     albuterol (Proventil HFA) 90 mcg/act inhaler; Inhale 2 puffs every 6 (six) hours as needed for wheezing  -     XR chest pa & lateral; Future; Expected date: 2023    2  Cough in adult    3  COVID-19      BMI Counseling: Body mass index is 41 13 kg/m²  The BMI is above normal  Nutrition recommendations include decreasing portion sizes, encouraging healthy choices of fruits and vegetables, consuming healthier snacks, limiting drinks that contain sugar, moderation in carbohydrate intake and increasing intake of lean protein  Exercise recommendations include exercising 3-5 times per week  Rationale for BMI follow-up plan is due to patient being overweight or obese  Depression Screening and Follow-up Plan: Patient was screened for depression during today's encounter  They screened negative with a PHQ-2 score of 0  Subjective      Since COVID in the end of October she has had a persistent cough that is nonproductive in nature  Is not sure if she has heard herself wheezing or not but she has had no fever purulent sputum  Cough  This is a recurrent problem  The current episode started more than 1 month ago  The problem has been waxing and waning  The problem occurs every few minutes  The cough is non-productive  Associated symptoms include wheezing  Pertinent negatives include no chest pain, chills, ear pain, fever, hemoptysis, nasal congestion, postnasal drip, rash, sore throat, shortness of breath or weight loss  The symptoms are aggravated by cold air and exercise  She has tried OTC cough suppressant and rest for the symptoms  The treatment provided no relief  covid in oct     Review of Systems   Constitutional: Negative for chills, fever and weight loss     HENT: Negative for ear pain, postnasal drip and sore throat  Eyes: Negative for pain and visual disturbance  Respiratory: Positive for cough and wheezing  Negative for hemoptysis and shortness of breath  Cardiovascular: Negative for chest pain and palpitations  Gastrointestinal: Negative for abdominal pain and vomiting  Genitourinary: Negative for dysuria and hematuria  Musculoskeletal: Negative for arthralgias and back pain  Skin: Negative for color change and rash  Neurological: Negative for seizures and syncope  All other systems reviewed and are negative  Current Outpatient Medications on File Prior to Visit   Medication Sig   • desogestrel-ethinyl estradiol (APRI) 0 15-30 MG-MCG per tablet Take 1 tablet by mouth daily   • fexofenadine (ALLEGRA) 180 MG tablet TAKE 1 TABLET BY MOUTH EVERY DAY   • fluticasone (FLONASE) 50 mcg/act nasal spray 2 sprays into each nostril daily       Objective     /78 (BP Location: Left arm, Patient Position: Sitting, Cuff Size: Standard)   Pulse 86   Temp 97 8 °F (36 6 °C)   Ht 5' 6" (1 676 m)   Wt 116 kg (254 lb 12 8 oz)   SpO2 98%   BMI 41 13 kg/m²     Physical Exam  Constitutional:       General: She is not in acute distress  Appearance: Normal appearance  She is well-developed  She is obese  HENT:      Right Ear: Tympanic membrane and external ear normal       Left Ear: Tympanic membrane and external ear normal       Nose: Nose normal       Mouth/Throat:      Pharynx: Posterior oropharyngeal erythema (mild) present  No oropharyngeal exudate  Eyes:      Extraocular Movements: Extraocular movements intact  Pupils: Pupils are equal, round, and reactive to light  Neck:      Thyroid: No thyromegaly  Vascular: No JVD  Cardiovascular:      Rate and Rhythm: Normal rate and regular rhythm  Heart sounds: Normal heart sounds  No murmur heard  No gallop  Pulmonary:      Effort: Pulmonary effort is normal  No respiratory distress  Breath sounds: Wheezing (Rare at bases) present  No rales  Abdominal:      General: Bowel sounds are normal  There is no distension  Palpations: Abdomen is soft  There is no mass  Tenderness: There is no abdominal tenderness  Musculoskeletal:         General: No tenderness  Normal range of motion  Cervical back: Normal range of motion and neck supple  Right lower leg: No edema  Left lower leg: No edema  Lymphadenopathy:      Cervical: No cervical adenopathy  Skin:     Findings: No rash  Neurological:      Mental Status: She is alert and oriented to person, place, and time  Cranial Nerves: No cranial nerve deficit  Coordination: Coordination normal    Psychiatric:         Behavior: Behavior normal          Thought Content:  Thought content normal          Judgment: Judgment normal        Richi Patel MD

## 2023-01-04 NOTE — PATIENT INSTRUCTIONS
Acute Cough   AMBULATORY CARE:   An acute cough  can last up to 3 weeks  Common causes of an acute cough include a cold, allergies, or a lung infection  Seek care immediately if:   You have trouble breathing or feel short of breath  You cough up blood, or you see blood in your mucus  You faint or feel weak or dizzy  You have chest pain when you cough or take a deep breath  You have new wheezing  Contact your healthcare provider if:   You have a fever  Your cough lasts longer than 4 weeks  Your symptoms do not improve with treatment  You have questions or concerns about your condition or care  Treatment:  An acute cough usually goes away on its own  Ask your healthcare provider about medicines you can take to decrease your cough  You may need medicine to stop the cough, decrease swelling in your airways, or help open your airways  Medicine may also be given to help you cough up mucus  If you have an infection caused by bacteria, you may need antibiotics  Manage your symptoms:   Do not smoke and stay away from others who smoke  Nicotine and other chemicals in cigarettes and cigars can cause lung damage and make your cough worse  Ask your healthcare provider for information if you currently smoke and need help to quit  E-cigarettes or smokeless tobacco still contain nicotine  Talk to your healthcare provider before you use these products  Drink extra liquids as directed  Liquids will help thin and loosen mucus so you can cough it up  Liquids will also help prevent dehydration  Examples of good liquids to drink include water, fruit juice, and broth  Do not drink liquids that contain caffeine  Caffeine can increase your risk for dehydration  Ask your healthcare provider how much liquid to drink each day  Rest as directed  Do not do activities that make your cough worse, such as exercise  Use a humidifier or vaporizer    Use a cool mist humidifier or a vaporizer to increase air moisture in your home  This may make it easier for you to breathe and help decrease your cough  Eat 2 to 5 mL of honey 2 times each day  Honey can help thin mucus and decrease your cough  Use cough drops or lozenges  These can help decrease throat irritation and your cough  Follow up with your healthcare provider as directed:  Write down your questions so you remember to ask them during your visits  © Copyright [employer] 2022 Information is for End User's use only and may not be sold, redistributed or otherwise used for commercial purposes  All illustrations and images included in CareNotes® are the copyrighted property of [employer]  or [address] [place], [place] [address]leela carlos   The above information is an  only  It is not intended as medical advice for individual conditions or treatments  Talk to your doctor, nurse or pharmacist before following any medical regimen to see if it is safe and effective for you

## 2023-01-12 ENCOUNTER — VBI (OUTPATIENT)
Dept: ADMINISTRATIVE | Facility: OTHER | Age: 28
End: 2023-01-12

## 2023-03-05 PROBLEM — R05.2 SUBACUTE COUGH: Status: RESOLVED | Noted: 2019-03-18 | Resolved: 2023-03-05

## 2023-03-09 ENCOUNTER — VBI (OUTPATIENT)
Dept: ADMINISTRATIVE | Facility: OTHER | Age: 28
End: 2023-03-09

## 2023-05-09 ENCOUNTER — VBI (OUTPATIENT)
Dept: ADMINISTRATIVE | Facility: OTHER | Age: 28
End: 2023-05-09

## 2023-05-25 ENCOUNTER — OFFICE VISIT (OUTPATIENT)
Dept: INTERNAL MEDICINE CLINIC | Facility: OTHER | Age: 28
End: 2023-05-25

## 2023-05-25 VITALS
TEMPERATURE: 97.2 F | DIASTOLIC BLOOD PRESSURE: 72 MMHG | OXYGEN SATURATION: 96 % | WEIGHT: 257 LBS | HEART RATE: 66 BPM | HEIGHT: 66 IN | SYSTOLIC BLOOD PRESSURE: 114 MMHG | BODY MASS INDEX: 41.3 KG/M2

## 2023-05-25 DIAGNOSIS — K52.9 GASTROENTERITIS: Primary | ICD-10-CM

## 2023-05-25 PROBLEM — U07.1 COVID-19: Status: RESOLVED | Noted: 2022-10-21 | Resolved: 2023-05-25

## 2023-05-25 NOTE — PROGRESS NOTES
Assessment/Plan:    Problem List Items Addressed This Visit        Digestive    Gastroenteritis - Primary     - most likely viral infection as symptoms are gradually improving and she remains afebrile  - she does have some LLQ tenderness on exam, no guarding    - recommend clear liquid diet x 24 hours, then advance to soft bland diet  - adequate hydration   - rest  - advised for any worsening abdominal pain or new onset fever she should go directly to the ER  Pt verbalized understanding               M*Modal software was used to dictate this note  It may contain errors with dictating incorrect words or incorrect spelling  Please contact the provider directly with any questions  Subjective:      Patient ID: Brendan Hauser is a 33 y o  female  Patient presents today as a new patient of our office with concerns for diarrhea and abdominal pain x 5 days  She is having about 2-5 episodes of diarrhea per day  She notes a small amount of red blood when wiping but endorses a hx of hemorrhoids  Abdominal Pain  This is a new problem  The problem occurs intermittently  The pain is located in the RUQ and epigastric region  The pain is at a severity of 2/10  The pain is mild  The quality of the pain is dull  Associated symptoms include diarrhea, flatus and nausea  Pertinent negatives include no belching, constipation, dysuria, fever, frequency, hematuria or vomiting  The pain is aggravated by eating  The pain is relieved by nothing  She has tried acetaminophen (Tums) for the symptoms  The treatment provided mild relief  There is no history of abdominal surgery or irritable bowel syndrome  Diarrhea   This is a new problem  The current episode started in the past 7 days  The problem occurs 2 to 4 times per day  The problem has been gradually improving (very minimally improving )  Diarrhea characteristics: watery and very soft stool  The patient states that diarrhea awakens her from sleep   Associated symptoms "include abdominal pain (middle/ RUQ) and increased flatus  Pertinent negatives include no bloating, chills, fever, sweats or vomiting  There are no known risk factors  Treatments tried: Tums  There is no history of inflammatory bowel disease, irritable bowel syndrome or a recent abdominal surgery  The following portions of the patient's history were reviewed and updated as appropriate: allergies, current medications, past family history, past medical history, past social history, past surgical history and problem list     Review of Systems   Constitutional: Negative for chills and fever  Gastrointestinal: Positive for abdominal pain (middle/ RUQ), diarrhea, flatus and nausea  Negative for bloating, constipation and vomiting  Genitourinary: Negative for dysuria, frequency and hematuria  Past Medical History:   Diagnosis Date   • ECM (erythema chronicum migrans) 06/17/2015    Per [employer]         Current Outpatient Medications:   •  desogestrel-ethinyl estradiol (APRI) 0 15-30 MG-MCG per tablet, Take 1 tablet by mouth daily, Disp: , Rfl:     Allergies   Allergen Reactions   • Sulfamethoxazole-Trimethoprim Rash       Social History   Past Surgical History:   Procedure Laterality Date   • TOOTH EXTRACTION       History reviewed  No pertinent family history  Objective:  /72 (BP Location: Left arm, Patient Position: Sitting, Cuff Size: Large)   Pulse 66   Temp (!) 97 2 °F (36 2 °C) (Temporal)   Ht 5' 6\" (1 676 m)   Wt 117 kg (257 lb)   SpO2 96%   BMI 41 48 kg/m²      Physical Exam  Vitals reviewed  Constitutional:       General: She is not in acute distress  Appearance: Normal appearance  She is obese  She is not diaphoretic  HENT:      Head: Normocephalic and atraumatic  Eyes:      Extraocular Movements: Extraocular movements intact  Conjunctiva/sclera: Conjunctivae normal       Pupils: Pupils are equal, round, and reactive to light     Cardiovascular:      Rate and " Rhythm: Normal rate and regular rhythm  Heart sounds: Normal heart sounds  No murmur heard  Pulmonary:      Effort: Pulmonary effort is normal  No respiratory distress  Breath sounds: Normal breath sounds  No wheezing, rhonchi or rales  Abdominal:      General: Bowel sounds are normal  There is no distension  Palpations: Abdomen is soft  There is no mass  Tenderness: There is abdominal tenderness (epigastric and LLQ)  There is no right CVA tenderness, left CVA tenderness or guarding  Skin:     General: Skin is warm and dry  Neurological:      Mental Status: She is alert and oriented to person, place, and time  Mental status is at baseline  Psychiatric:         Mood and Affect: Mood normal          Behavior: Behavior normal          Thought Content:  Thought content normal          Judgment: Judgment normal

## 2023-05-25 NOTE — PATIENT INSTRUCTIONS
-Start clear liquid diet x 24 hours, then advance to soft diet as tolerated   Keep food bland   -symptoms should progressively improve   -adequate hydration - gatorade, water, Pedialyte   -for any worsening pain or new fever go directly to the ER
absent

## 2023-05-25 NOTE — ASSESSMENT & PLAN NOTE
- most likely viral infection as symptoms are gradually improving and she remains afebrile  - she does have some LLQ tenderness on exam, no guarding    - recommend clear liquid diet x 24 hours, then advance to soft bland diet  - adequate hydration   - rest  - advised for any worsening abdominal pain or new onset fever she should go directly to the ER   Pt verbalized understanding

## 2023-06-09 ENCOUNTER — RA CDI HCC (OUTPATIENT)
Dept: OTHER | Facility: HOSPITAL | Age: 28
End: 2023-06-09

## 2023-06-09 NOTE — PROGRESS NOTES
[facility]  coding opportunities       Chart reviewed, no opportunity found: CHART REVIEWED, NO OPPORTUNITY FOUND        Patients Insurance        Commercial Insurance: [address] [place] [address]

## 2023-06-28 ENCOUNTER — LAB (OUTPATIENT)
Dept: LAB | Facility: IMAGING CENTER | Age: 28
End: 2023-06-28
Payer: COMMERCIAL

## 2023-06-28 ENCOUNTER — OFFICE VISIT (OUTPATIENT)
Dept: INTERNAL MEDICINE CLINIC | Facility: OTHER | Age: 28
End: 2023-06-28
Payer: COMMERCIAL

## 2023-06-28 VITALS
WEIGHT: 258.2 LBS | BODY MASS INDEX: 41.5 KG/M2 | DIASTOLIC BLOOD PRESSURE: 80 MMHG | HEART RATE: 83 BPM | OXYGEN SATURATION: 98 % | HEIGHT: 66 IN | SYSTOLIC BLOOD PRESSURE: 132 MMHG | TEMPERATURE: 97.9 F | RESPIRATION RATE: 18 BRPM

## 2023-06-28 DIAGNOSIS — Z83.3 FAMILY HISTORY OF DIABETES MELLITUS: ICD-10-CM

## 2023-06-28 DIAGNOSIS — K44.9 HIATAL HERNIA WITH GERD WITHOUT ESOPHAGITIS: ICD-10-CM

## 2023-06-28 DIAGNOSIS — Z13.6 ENCOUNTER FOR LIPID SCREENING FOR CARDIOVASCULAR DISEASE: ICD-10-CM

## 2023-06-28 DIAGNOSIS — Z00.00 ANNUAL PHYSICAL EXAM: ICD-10-CM

## 2023-06-28 DIAGNOSIS — R10.32 LLQ ABDOMINAL PAIN: Primary | ICD-10-CM

## 2023-06-28 DIAGNOSIS — Z13.220 ENCOUNTER FOR LIPID SCREENING FOR CARDIOVASCULAR DISEASE: ICD-10-CM

## 2023-06-28 DIAGNOSIS — K21.9 HIATAL HERNIA WITH GERD WITHOUT ESOPHAGITIS: ICD-10-CM

## 2023-06-28 PROBLEM — K52.9 GASTROENTERITIS: Status: RESOLVED | Noted: 2023-05-25 | Resolved: 2023-06-28

## 2023-06-28 LAB
ALBUMIN SERPL BCP-MCNC: 3.7 G/DL (ref 3.5–5)
ALP SERPL-CCNC: 71 U/L (ref 46–116)
ALT SERPL W P-5'-P-CCNC: 16 U/L (ref 12–78)
ANION GAP SERPL CALCULATED.3IONS-SCNC: 6 MMOL/L
AST SERPL W P-5'-P-CCNC: 10 U/L (ref 5–45)
BILIRUB SERPL-MCNC: 0.27 MG/DL (ref 0.2–1)
BUN SERPL-MCNC: 12 MG/DL (ref 5–25)
CALCIUM SERPL-MCNC: 9.5 MG/DL (ref 8.3–10.1)
CHLORIDE SERPL-SCNC: 114 MMOL/L (ref 96–108)
CHOLEST SERPL-MCNC: 239 MG/DL
CO2 SERPL-SCNC: 22 MMOL/L (ref 21–32)
CREAT SERPL-MCNC: 0.99 MG/DL (ref 0.6–1.3)
ERYTHROCYTE [DISTWIDTH] IN BLOOD BY AUTOMATED COUNT: 12.5 % (ref 11.6–15.1)
EST. AVERAGE GLUCOSE BLD GHB EST-MCNC: 103 MG/DL
GFR SERPL CREATININE-BSD FRML MDRD: 77 ML/MIN/1.73SQ M
GLUCOSE P FAST SERPL-MCNC: 97 MG/DL (ref 65–99)
HBA1C MFR BLD: 5.2 %
HCT VFR BLD AUTO: 41.2 % (ref 34.8–46.1)
HDLC SERPL-MCNC: 49 MG/DL
HGB BLD-MCNC: 13.1 G/DL (ref 11.5–15.4)
LDLC SERPL CALC-MCNC: 162 MG/DL (ref 0–100)
MCH RBC QN AUTO: 28.3 PG (ref 26.8–34.3)
MCHC RBC AUTO-ENTMCNC: 31.8 G/DL (ref 31.4–37.4)
MCV RBC AUTO: 89 FL (ref 82–98)
NONHDLC SERPL-MCNC: 190 MG/DL
PLATELET # BLD AUTO: 281 THOUSANDS/UL (ref 149–390)
PMV BLD AUTO: 12.2 FL (ref 8.9–12.7)
POTASSIUM SERPL-SCNC: 4.1 MMOL/L (ref 3.5–5.3)
PROT SERPL-MCNC: 7.6 G/DL (ref 6.4–8.4)
RBC # BLD AUTO: 4.63 MILLION/UL (ref 3.81–5.12)
SODIUM SERPL-SCNC: 142 MMOL/L (ref 135–147)
TRIGL SERPL-MCNC: 140 MG/DL
WBC # BLD AUTO: 7.5 THOUSAND/UL (ref 4.31–10.16)

## 2023-06-28 PROCEDURE — 99385 PREV VISIT NEW AGE 18-39: CPT | Performed by: INTERNAL MEDICINE

## 2023-06-28 PROCEDURE — 99213 OFFICE O/P EST LOW 20 MIN: CPT | Performed by: INTERNAL MEDICINE

## 2023-06-28 PROCEDURE — 36415 COLL VENOUS BLD VENIPUNCTURE: CPT

## 2023-06-28 PROCEDURE — 80061 LIPID PANEL: CPT

## 2023-06-28 PROCEDURE — 83036 HEMOGLOBIN GLYCOSYLATED A1C: CPT

## 2023-06-28 PROCEDURE — 85027 COMPLETE CBC AUTOMATED: CPT

## 2023-06-28 PROCEDURE — 80053 COMPREHEN METABOLIC PANEL: CPT

## 2023-06-28 NOTE — PROGRESS NOTES
ADULT ANNUAL PHYSICAL  [facility] [place]    NAME: Eva Lundy  AGE: 33 y o  SEX: female  : 1995     DATE: 2023     Assessment and Plan:     Problem List Items Addressed This Visit        Digestive    Hiatal hernia with GERD without esophagitis     Controlled with lifestyle and dietary modifications  Other    BMI 40 0-44 9, adult (HCC)     Discussed diet and exercise  LLQ abdominal pain - Primary     Will order an US of the abdomen and pelvis with transvaginal for further evaluation  Relevant Orders    US abdomen and pelvis with transvaginal    Annual physical exam     Discussed diet and exercise  She is up-to-date on immunizations  We will order CBC, CMP, A1c, lipid panel for screening  Continue follow-up with gynecology  Relevant Orders    CBC    Comprehensive metabolic panel    Lipid panel    HEMOGLOBIN A1C W/ EAG ESTIMATION   Other Visit Diagnoses     Family history of diabetes mellitus        Relevant Orders    Comprehensive metabolic panel    HEMOGLOBIN A1C W/ EAG ESTIMATION    Encounter for lipid screening for cardiovascular disease        Relevant Orders    CBC    Comprehensive metabolic panel    Lipid panel          Immunizations and preventive care screenings were discussed with patient today  Appropriate education was printed on patient's after visit summary  Counseling:  Alcohol/drug use: discussed moderation in alcohol intake, the recommendations for healthy alcohol use, and avoidance of illicit drug use  Dental Health: discussed importance of regular tooth brushing, flossing, and dental visits  Injury prevention: discussed safety/seat belts, safety helmets, smoke detectors, carbon dioxide detectors, and smoking near bedding or upholstery    Sexual health: discussed sexually transmitted diseases, partner selection, use of condoms, avoidance of unintended pregnancy, and contraceptive alternatives  Exercise: the importance of regular exercise/physical activity was discussed  Recommend exercise 3-5 times per week for at least 30 minutes  BMI Counseling: Body mass index is 41 67 kg/m²  The BMI is above normal  Nutrition recommendations include decreasing portion sizes, encouraging healthy choices of fruits and vegetables, decreasing fast food intake, consuming healthier snacks, limiting drinks that contain sugar, moderation in carbohydrate intake, increasing intake of lean protein, reducing intake of saturated and trans fat and reducing intake of cholesterol  Exercise recommendations include moderate physical activity 150 minutes/week and exercising 3-5 times per week  No pharmacotherapy was ordered  Patient referred to PCP  Rationale for BMI follow-up plan is due to patient being overweight or obese  Return in about 1 year (around 6/28/2024) for Annual physical      Chief Complaint:     Chief Complaint   Patient presents with   • Establish Care   • hm     Annual, cervical   • Abdominal Pain     On and off lower left side  She was told end of May she had diverticulitis but stools have been regular and just want to do further testing      History of Present Illness:     Adult Annual Physical   Patient here for a comprehensive physical exam  The patient reports problems - :   33year old female is seen today to establish care  She has no known past medical history  She has a FHx of DM (both parents)  She admits to not exercising regular and follows a regular diet  She drinks alcohol socially, and denies tobacco or illicit drug use  He was recently diagnosed with suspected enteritis  She continues to experience intermittent left lower quadrant abdominal pain  It was suspected to be viral in etiology and was advised to follow a clear liquid diet which she did and advanced slowly to where she is now on a regular diet    She continues to have intermittent left lower quadrant pain, occurring almost daily  Denies any known aggravating factors  Diet and Physical Activity  Diet/Nutrition: well balanced diet  Exercise: no formal exercise  Depression Screening  PHQ-2/9 Depression Screening         General Health  Sleep: sleeps well and gets 7-8 hours of sleep on average  Hearing: normal - bilateral   Vision: no vision problems, goes for regular eye exams and wears glasses  Dental: regular dental visits, no dental visits for >1 year and flosses teeth occasionally  /GYN Health  Last menstrual period: 6/27/2023  Contraceptive method: oral contraceptives  History of STDs?: no      Review of Systems:     Review of Systems   Constitutional: Negative for activity change, appetite change, chills, diaphoresis, fatigue and fever  HENT: Negative for congestion, postnasal drip, rhinorrhea, sinus pressure, sinus pain, sneezing and sore throat  Eyes: Negative for visual disturbance  Respiratory: Negative for apnea, cough, choking, chest tightness, shortness of breath and wheezing  Cardiovascular: Negative for chest pain, palpitations and leg swelling  Gastrointestinal: Negative for abdominal distention, abdominal pain, anal bleeding, blood in stool, constipation, diarrhea, nausea and vomiting  Endocrine: Negative for cold intolerance and heat intolerance  Genitourinary: Negative for difficulty urinating, dysuria and hematuria  Musculoskeletal: Negative  Skin: Negative  Neurological: Negative for dizziness, weakness, light-headedness, numbness and headaches  Hematological: Negative for adenopathy  Psychiatric/Behavioral: Negative for agitation, sleep disturbance and suicidal ideas  All other systems reviewed and are negative       Past Medical History:     Past Medical History:   Diagnosis Date   • Diverticulitis of colon    • ECM (erythema chronicum migrans) 06/17/2015    Per [employer]      Past Surgical History:     Past Surgical History: "  Procedure Laterality Date   • TOOTH EXTRACTION        Social History:     Social History     Socioeconomic History   • Marital status: /Civil Union     Spouse name: None   • Number of children: None   • Years of education: None   • Highest education level: None   Occupational History   • None   Tobacco Use   • Smoking status: Never   • Smokeless tobacco: Never   Vaping Use   • Vaping Use: Never used   Substance and Sexual Activity   • Alcohol use: Yes     Alcohol/week: 1 0 standard drink of alcohol     Types: 1 Glasses of wine per week     Comment: 2 weekly   • Drug use: No   • Sexual activity: None   Other Topics Concern   • None   Social History Narrative   • None     Social Determinants of Health     Financial Resource Strain: Not on file   Food Insecurity: Not on file   Transportation Needs: Not on file   Physical Activity: Not on file   Stress: Not on file   Social Connections: Not on file   Intimate Partner Violence: Not on file   Housing Stability: Not on file      Family History:     History reviewed  No pertinent family history  Current Medications:     Current Outpatient Medications   Medication Sig Dispense Refill   • desogestrel-ethinyl estradiol (APRI) 0 15-30 MG-MCG per tablet Take 1 tablet by mouth daily       No current facility-administered medications for this visit  Allergies: Allergies   Allergen Reactions   • Sulfamethoxazole-Trimethoprim Rash      Physical Exam:     /80 (BP Location: Left arm, Patient Position: Sitting, Cuff Size: Large)   Pulse 83   Temp 97 9 °F (36 6 °C) (Temporal)   Resp 18   Ht 5' 6\" (1 676 m)   Wt 117 kg (258 lb 3 2 oz)   SpO2 98%   BMI 41 67 kg/m²     Physical Exam  Vitals and nursing note reviewed  Constitutional:       General: She is not in acute distress  Appearance: Normal appearance  She is normal weight  She is not ill-appearing  HENT:      Head: Normocephalic and atraumatic        Right Ear: Tympanic membrane, ear canal and " external ear normal  There is no impacted cerumen  Left Ear: Tympanic membrane, ear canal and external ear normal  There is no impacted cerumen  Nose: Nose normal  No congestion or rhinorrhea  Mouth/Throat:      Mouth: Mucous membranes are moist       Pharynx: Oropharynx is clear  No oropharyngeal exudate or posterior oropharyngeal erythema  Eyes:      General: No scleral icterus  Right eye: No discharge  Left eye: No discharge  Extraocular Movements: Extraocular movements intact  Conjunctiva/sclera: Conjunctivae normal       Pupils: Pupils are equal, round, and reactive to light  Neck:      Vascular: No carotid bruit  Cardiovascular:      Rate and Rhythm: Normal rate and regular rhythm  Pulses: Normal pulses  Heart sounds: Normal heart sounds  No murmur heard  No friction rub  No gallop  Pulmonary:      Effort: Pulmonary effort is normal  No respiratory distress  Breath sounds: Normal breath sounds  No stridor  No wheezing, rhonchi or rales  Chest:      Chest wall: No tenderness  Abdominal:      General: Abdomen is flat  Bowel sounds are normal  There is no distension  Palpations: Abdomen is soft  There is no mass  Tenderness: There is abdominal tenderness (mild TTP) in the left lower quadrant  Hernia: No hernia is present  Musculoskeletal:         General: No swelling, tenderness, deformity or signs of injury  Normal range of motion  Cervical back: Normal range of motion and neck supple  No rigidity  No muscular tenderness  Right lower leg: No edema  Left lower leg: No edema  Lymphadenopathy:      Cervical: No cervical adenopathy  Skin:     General: Skin is warm and dry  Capillary Refill: Capillary refill takes less than 2 seconds  Coloration: Skin is not jaundiced or pale  Findings: No bruising, erythema, lesion or rash  Neurological:      General: No focal deficit present        Mental Status: She is alert and oriented to person, place, and time  Mental status is at baseline  Cranial Nerves: No cranial nerve deficit  Sensory: No sensory deficit  Motor: No weakness  Coordination: Coordination normal       Gait: Gait normal       Deep Tendon Reflexes: Reflexes normal    Psychiatric:         Mood and Affect: Mood normal          Behavior: Behavior normal          Thought Content:  Thought content normal          Judgment: Judgment normal           MD Radha Carpenter 54 [facility]

## 2023-06-28 NOTE — ASSESSMENT & PLAN NOTE
Discussed diet and exercise  She is up-to-date on immunizations  We will order CBC, CMP, A1c, lipid panel for screening  Continue follow-up with gynecology

## 2023-07-06 NOTE — RESULT ENCOUNTER NOTE
Please contact patient to inform her that I reviewed the results of her laboratory studies to which her total cholesterol cholesterol and LDL are elevated. Remaining laboratory studies are within normal range. I recommend following a healthy diet and exercise regimen.

## 2023-07-19 ENCOUNTER — HOSPITAL ENCOUNTER (OUTPATIENT)
Dept: RADIOLOGY | Facility: IMAGING CENTER | Age: 28
Discharge: HOME | End: 2023-07-19
Payer: COMMERCIAL

## 2023-07-19 DIAGNOSIS — R10.32 LLQ ABDOMINAL PAIN: ICD-10-CM

## 2023-07-19 PROCEDURE — 76856 US EXAM PELVIC COMPLETE: CPT

## 2023-07-19 PROCEDURE — 76830 TRANSVAGINAL US NON-OB: CPT

## 2023-07-19 PROCEDURE — 76700 US EXAM ABDOM COMPLETE: CPT

## 2023-07-25 DIAGNOSIS — K76.9 LIVER LESION: Primary | ICD-10-CM

## 2023-08-29 ENCOUNTER — APPOINTMENT (OUTPATIENT)
Dept: URGENT CARE | Age: 28
End: 2023-08-29
Payer: OTHER MISCELLANEOUS

## 2023-08-29 ENCOUNTER — CLINICAL SUPPORT (OUTPATIENT)
Dept: URGENT CARE | Age: 28
End: 2023-08-29
Payer: OTHER MISCELLANEOUS

## 2023-08-29 ENCOUNTER — TRANSCRIBE ORDERS (OUTPATIENT)
Dept: URGENT CARE | Age: 28
End: 2023-08-29
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
## 2023-08-29 LAB
ALT SERPL W P-5'-P-CCNC: 21 U/L (ref 7–52)
HBV SURFACE AB SER-ACNC: 266 MIU/ML
HBV SURFACE AG SER QL: NORMAL
HCV AB SER QL: NORMAL
HIV 1+2 AB+HIV1 P24 AG SERPL QL IA: NORMAL
HIV 2 AB SERPL QL IA: NORMAL
HIV1 AB SERPL QL IA: NORMAL
HIV1 P24 AG SERPL QL IA: NORMAL

## 2023-08-29 PROCEDURE — 87389 HIV-1 AG W/HIV-1&-2 AB AG IA: CPT

## 2023-08-29 PROCEDURE — 86706 HEP B SURFACE ANTIBODY: CPT

## 2023-08-29 PROCEDURE — G0382 LEV 3 HOSP TYPE B ED VISIT: HCPCS | Performed by: NURSE PRACTITIONER

## 2023-08-29 PROCEDURE — 86803 HEPATITIS C AB TEST: CPT

## 2023-08-29 PROCEDURE — 87340 HEPATITIS B SURFACE AG IA: CPT

## 2023-08-29 PROCEDURE — 99283 EMERGENCY DEPT VISIT LOW MDM: CPT | Performed by: NURSE PRACTITIONER

## 2023-08-29 PROCEDURE — 84460 ALANINE AMINO (ALT) (SGPT): CPT

## 2023-09-05 ENCOUNTER — APPOINTMENT (OUTPATIENT)
Dept: URGENT CARE | Age: 28
End: 2023-09-05
Payer: OTHER MISCELLANEOUS

## 2023-09-05 PROCEDURE — 99213 OFFICE O/P EST LOW 20 MIN: CPT | Performed by: NURSE PRACTITIONER

## 2024-01-17 ENCOUNTER — OFFICE VISIT (OUTPATIENT)
Dept: INTERNAL MEDICINE CLINIC | Facility: OTHER | Age: 29
End: 2024-01-17
Payer: COMMERCIAL

## 2024-01-17 VITALS
BODY MASS INDEX: 41.95 KG/M2 | SYSTOLIC BLOOD PRESSURE: 138 MMHG | HEART RATE: 97 BPM | WEIGHT: 261 LBS | OXYGEN SATURATION: 98 % | DIASTOLIC BLOOD PRESSURE: 78 MMHG | TEMPERATURE: 97.4 F | HEIGHT: 66 IN | RESPIRATION RATE: 20 BRPM

## 2024-01-17 DIAGNOSIS — Z20.828 EXPOSURE TO INFLUENZA: ICD-10-CM

## 2024-01-17 DIAGNOSIS — J06.9 VIRAL URI WITH COUGH: Primary | ICD-10-CM

## 2024-01-17 PROBLEM — R10.32 LLQ ABDOMINAL PAIN: Status: RESOLVED | Noted: 2023-06-28 | Resolved: 2024-01-17

## 2024-01-17 PROCEDURE — 99213 OFFICE O/P EST LOW 20 MIN: CPT | Performed by: INTERNAL MEDICINE

## 2024-01-17 PROCEDURE — 87636 SARSCOV2 & INF A&B AMP PRB: CPT | Performed by: INTERNAL MEDICINE

## 2024-01-17 RX ORDER — OSELTAMIVIR PHOSPHATE 75 MG/1
75 CAPSULE ORAL EVERY 12 HOURS SCHEDULED
Qty: 10 CAPSULE | Refills: 0 | Status: SHIPPED | OUTPATIENT
Start: 2024-01-17 | End: 2024-01-22

## 2024-01-17 NOTE — LETTER
January 17, 2024     Patient: Teena Lundy  YOB: 1995  Date of Visit: 1/17/2024      To Whom it May Concern:    Teena Lundy is under my professional care. Teena was seen in my office on 1/17/2024. Teena may return to work on 1/22/2024 .    If you have any questions or concerns, please don't hesitate to call.         Sincerely,          Bebo Grey MD        CC: No Recipients

## 2024-01-17 NOTE — ASSESSMENT & PLAN NOTE
Will start Tamiflu given URI symptoms and exposure to influenza.  Influenza/COVID PCR ordered.  Discussed over-the-counter medications to take for symptom relief.  Discussed droplet precautions.

## 2024-01-17 NOTE — PROGRESS NOTES
Assessment/Plan:    Viral URI with cough  Will start Tamiflu given URI symptoms and exposure to influenza.  Influenza/COVID PCR ordered.  Discussed over-the-counter medications to take for symptom relief.  Discussed droplet precautions.       Diagnoses and all orders for this visit:    Viral URI with cough  -     Covid/Flu- Office Collect Normal  -     oseltamivir (TAMIFLU) 75 mg capsule; Take 1 capsule (75 mg total) by mouth every 12 (twelve) hours for 5 days    Exposure to influenza  -     Covid/Flu- Office Collect Normal  -     oseltamivir (TAMIFLU) 75 mg capsule; Take 1 capsule (75 mg total) by mouth every 12 (twelve) hours for 5 days                  Subjective:      Patient ID: Teena Lundy is a 28 y.o. female.    Chief Complaint   Patient presents with   • Fever     Ongoing wont break started monday   • COVID-19     Tested neg last tested monday   • Sore Throat     yesterday   • Headache   • Diarrhea          • Nasal Congestion   • Nausea       Teena Lundy is seen today with acute URI and GI symptoms.   She admits to sick contacts, influenza from coworkers.   She tested negative for COVID via home test.    Fever  Associated symptoms include abdominal pain, congestion, coughing, headaches, nausea and a sore throat. Pertinent negatives include no chest pain, chills, diaphoresis, fatigue, fever, numbness, vomiting or weakness.   Sore Throat   Associated symptoms include abdominal pain, congestion, coughing, diarrhea and headaches. Pertinent negatives include no shortness of breath or vomiting.   Headache  Diarrhea   Associated symptoms include abdominal pain, coughing, headaches and a URI. Pertinent negatives include no chills, fever or vomiting.   Nausea  Associated symptoms include abdominal pain, congestion, coughing, headaches, nausea and a sore throat. Pertinent negatives include no chest pain, chills, diaphoresis, fatigue, fever, numbness, vomiting or weakness.   URI   This is a new  problem. The current episode started in the past 7 days. The problem has been unchanged. Associated symptoms include abdominal pain, congestion, coughing, diarrhea, headaches, nausea and a sore throat. Pertinent negatives include no chest pain, dysuria, rhinorrhea, sinus pain, sneezing, vomiting or wheezing. She has tried acetaminophen, NSAIDs and decongestant for the symptoms. The treatment provided mild relief.       The following portions of the patient's history were reviewed and updated as appropriate: allergies, current medications, past family history, past medical history, past social history, past surgical history, and problem list.    Review of Systems   Constitutional:  Negative for activity change, appetite change, chills, diaphoresis, fatigue and fever.   HENT:  Positive for congestion and sore throat. Negative for postnasal drip, rhinorrhea, sinus pressure, sinus pain and sneezing.    Eyes:  Negative for visual disturbance.   Respiratory:  Positive for cough. Negative for apnea, choking, chest tightness, shortness of breath and wheezing.    Cardiovascular:  Negative for chest pain, palpitations and leg swelling.   Gastrointestinal:  Positive for abdominal pain, diarrhea and nausea. Negative for abdominal distention, anal bleeding, blood in stool, constipation and vomiting.   Endocrine: Negative for cold intolerance and heat intolerance.   Genitourinary:  Negative for difficulty urinating, dysuria and hematuria.   Musculoskeletal: Negative.    Skin: Negative.    Neurological:  Positive for headaches. Negative for dizziness, weakness, light-headedness and numbness.   Hematological:  Negative for adenopathy.   Psychiatric/Behavioral:  Negative for agitation, sleep disturbance and suicidal ideas.    All other systems reviewed and are negative.        Past Medical History:   Diagnosis Date   • Diverticulitis of colon    • ECM (erythema chronicum migrans) 06/17/2015    Per [employer]         Current  "Outpatient Medications:   •  oseltamivir (TAMIFLU) 75 mg capsule, Take 1 capsule (75 mg total) by mouth every 12 (twelve) hours for 5 days, Disp: 10 capsule, Rfl: 0  •  desogestrel-ethinyl estradiol (APRI) 0.15-30 MG-MCG per tablet, Take 1 tablet by mouth daily, Disp: , Rfl:     Allergies   Allergen Reactions   • Sulfamethoxazole-Trimethoprim Rash       Social History   Past Surgical History:   Procedure Laterality Date   • TOOTH EXTRACTION       History reviewed. No pertinent family history.    Objective:  /78 (BP Location: Left arm, Patient Position: Sitting, Cuff Size: Large)   Pulse 97   Temp (!) 97.4 °F (36.3 °C) (Temporal)   Resp 20   Ht 5' 6\" (1.676 m)   Wt 118 kg (261 lb)   SpO2 98%   BMI 42.13 kg/m²     No results found for this or any previous visit (from the past 1344 hour(s)).         Physical Exam  Vitals and nursing note reviewed.   Constitutional:       General: She is not in acute distress.     Appearance: She is well-developed. She is ill-appearing. She is not diaphoretic.   HENT:      Head: Normocephalic and atraumatic.   Eyes:      General:         Right eye: No discharge.         Left eye: No discharge.      Conjunctiva/sclera: Conjunctivae normal.      Pupils: Pupils are equal, round, and reactive to light.   Neck:      Thyroid: No thyromegaly.      Vascular: No JVD.   Cardiovascular:      Rate and Rhythm: Normal rate and regular rhythm.      Heart sounds: Normal heart sounds. No murmur heard.     No friction rub. No gallop.   Pulmonary:      Effort: Pulmonary effort is normal. No respiratory distress.      Breath sounds: Normal breath sounds. No wheezing or rales.   Chest:      Chest wall: No tenderness.   Abdominal:      General: There is no distension.      Palpations: Abdomen is soft.      Tenderness: There is no abdominal tenderness.   Musculoskeletal:         General: No tenderness or deformity. Normal range of motion.      Cervical back: Normal range of motion and neck " supple.   Lymphadenopathy:      Cervical: No cervical adenopathy.   Skin:     General: Skin is warm and dry.      Coloration: Skin is not pale.      Findings: No erythema or rash.   Neurological:      Mental Status: She is alert and oriented to person, place, and time.      Cranial Nerves: No cranial nerve deficit.      Coordination: Coordination normal.   Psychiatric:         Behavior: Behavior normal.         Thought Content: Thought content normal.         Judgment: Judgment normal.

## 2024-01-18 LAB
FLUAV RNA RESP QL NAA+PROBE: NEGATIVE
FLUBV RNA RESP QL NAA+PROBE: NEGATIVE
SARS-COV-2 RNA RESP QL NAA+PROBE: NEGATIVE

## 2024-01-25 ENCOUNTER — HOSPITAL ENCOUNTER (OUTPATIENT)
Dept: RADIOLOGY | Facility: HOSPITAL | Age: 29
Discharge: HOME | End: 2024-01-25
Attending: INTERNAL MEDICINE
Payer: COMMERCIAL

## 2024-01-25 DIAGNOSIS — K76.9 LIVER LESION: ICD-10-CM

## 2024-01-25 PROCEDURE — G1004 CDSM NDSC: HCPCS

## 2024-01-25 PROCEDURE — A9585 GADOBUTROL INJECTION: HCPCS | Performed by: INTERNAL MEDICINE

## 2024-01-25 PROCEDURE — 74183 MRI ABD W/O CNTR FLWD CNTR: CPT

## 2024-01-25 RX ORDER — GADOBUTROL 604.72 MG/ML
11 INJECTION INTRAVENOUS
Status: COMPLETED | OUTPATIENT
Start: 2024-01-25 | End: 2024-01-25

## 2024-01-25 RX ADMIN — GADOBUTROL 11 ML: 604.72 INJECTION INTRAVENOUS at 08:06

## 2024-02-06 ENCOUNTER — TELEPHONE (OUTPATIENT)
Dept: INTERNAL MEDICINE CLINIC | Facility: OTHER | Age: 29
End: 2024-02-06

## 2024-02-06 NOTE — TELEPHONE ENCOUNTER
"Patient was called 3x. Left message on machine.    PLEASE READ DR RAMIREZ'S RESULT NOTES TO THE PT AS FOLLOWS:    \"Please contact patient to inform her that I reviewed the results of her MRI abdomen to which previously visualized findings on the ultrasound were no longer appreciated and are considered unlikely for clinical concern/significance.  She was found to have a mildly fatty liver as well as a large hiatal hernia.  Regarding the fatty liver, I recommend following healthy diet and exercise regimen.  Management of hiatal hernia is symptomatic with antacids and avoiding large meals at least 3 hours before bedtime.\"  "

## 2024-02-21 PROBLEM — J06.9 VIRAL URI WITH COUGH: Status: RESOLVED | Noted: 2019-06-04 | Resolved: 2024-02-21

## 2024-05-16 ENCOUNTER — OFFICE VISIT (OUTPATIENT)
Dept: INTERNAL MEDICINE CLINIC | Facility: OTHER | Age: 29
End: 2024-05-16
Payer: COMMERCIAL

## 2024-05-16 VITALS
SYSTOLIC BLOOD PRESSURE: 114 MMHG | OXYGEN SATURATION: 98 % | WEIGHT: 263 LBS | BODY MASS INDEX: 42.27 KG/M2 | RESPIRATION RATE: 20 BRPM | TEMPERATURE: 97.9 F | DIASTOLIC BLOOD PRESSURE: 72 MMHG | HEIGHT: 66 IN | HEART RATE: 83 BPM

## 2024-05-16 DIAGNOSIS — R35.0 URINE FREQUENCY: Primary | ICD-10-CM

## 2024-05-16 DIAGNOSIS — M54.50 LOW BACK PAIN WITHOUT SCIATICA, UNSPECIFIED BACK PAIN LATERALITY, UNSPECIFIED CHRONICITY: ICD-10-CM

## 2024-05-16 DIAGNOSIS — N30.00 ACUTE CYSTITIS WITHOUT HEMATURIA: ICD-10-CM

## 2024-05-16 LAB
SL AMB  POCT GLUCOSE, UA: NORMAL
SL AMB LEUKOCYTE ESTERASE,UA: NORMAL
SL AMB POCT BILIRUBIN,UA: NORMAL
SL AMB POCT BLOOD,UA: NORMAL
SL AMB POCT CLARITY,UA: NORMAL
SL AMB POCT COLOR,UA: CLEAR
SL AMB POCT KETONES,UA: NORMAL
SL AMB POCT NITRITE,UA: NORMAL
SL AMB POCT PH,UA: 5.5
SL AMB POCT SPECIFIC GRAVITY,UA: 1.01
SL AMB POCT URINE PROTEIN: NORMAL
SL AMB POCT UROBILINOGEN: NORMAL

## 2024-05-16 PROCEDURE — 81003 URINALYSIS AUTO W/O SCOPE: CPT | Performed by: INTERNAL MEDICINE

## 2024-05-16 PROCEDURE — 99213 OFFICE O/P EST LOW 20 MIN: CPT | Performed by: INTERNAL MEDICINE

## 2024-05-16 PROCEDURE — 87086 URINE CULTURE/COLONY COUNT: CPT | Performed by: INTERNAL MEDICINE

## 2024-05-16 RX ORDER — CEPHALEXIN 500 MG/1
500 CAPSULE ORAL 2 TIMES DAILY
Qty: 14 CAPSULE | Refills: 0 | Status: SHIPPED | OUTPATIENT
Start: 2024-05-16 | End: 2024-05-23

## 2024-05-16 NOTE — PROGRESS NOTES
Assessment/Plan:    Acute cystitis without hematuria  Will start Keflex twice daily for 7 days.  Discussed increasing oral hydration.  Will follow-up urine culture.       Diagnoses and all orders for this visit:    Urine frequency  -     Urine culture  -     POCT urine dip auto non-scope  -     cephalexin (KEFLEX) 500 mg capsule; Take 1 capsule (500 mg total) by mouth 2 (two) times a day for 7 days    Low back pain without sciatica, unspecified back pain laterality, unspecified chronicity  -     Urine culture  -     POCT urine dip auto non-scope    BMI 40.0-44.9, adult (HCC)    Acute cystitis without hematuria  -     cephalexin (KEFLEX) 500 mg capsule; Take 1 capsule (500 mg total) by mouth 2 (two) times a day for 7 days                  Subjective:      Patient ID: Teena Lundy is a 29 y.o. female.    Chief Complaint   Patient presents with   • Back Pain     Lower - started yesterday    • Fever     Unable to check but had chills    • hm     Pap, phq   • Urinary Tract Infection     Some frequency        Teena Lundy is seen today with UTI symptoms since last night.     Fever - 9 weeks to 74 years   This is a new problem. The current episode started yesterday. The problem occurs 2 to 4 times per day. The problem has been gradually worsening. Her temperature was unmeasured prior to arrival. Associated symptoms include headaches and muscle aches. Pertinent negatives include no abdominal pain, chest pain, congestion, coughing, diarrhea, nausea, sore throat, urinary pain, vomiting or wheezing.   Risk factors: hx of cancer    Risk factors: no contaminated food, no contaminated water, no immunosuppression, no occupational exposure, no recent sickness, no recent travel and no sick contacts        The following portions of the patient's history were reviewed and updated as appropriate: allergies, current medications, past family history, past medical history, past social history, past surgical history, and  "problem list.    Review of Systems   Constitutional:  Positive for fever. Negative for activity change, appetite change, chills, diaphoresis and fatigue.   HENT:  Negative for congestion, postnasal drip, rhinorrhea, sinus pressure, sinus pain, sneezing and sore throat.    Eyes:  Negative for visual disturbance.   Respiratory:  Negative for apnea, cough, choking, chest tightness, shortness of breath and wheezing.    Cardiovascular:  Negative for chest pain, palpitations and leg swelling.   Gastrointestinal:  Negative for abdominal distention, abdominal pain, anal bleeding, blood in stool, constipation, diarrhea, nausea and vomiting.   Endocrine: Negative for cold intolerance and heat intolerance.   Genitourinary:  Negative for difficulty urinating, dysuria and hematuria.   Musculoskeletal: Negative.    Skin: Negative.    Neurological:  Positive for headaches. Negative for dizziness, weakness, light-headedness and numbness.   Hematological:  Negative for adenopathy.   Psychiatric/Behavioral:  Negative for agitation, sleep disturbance and suicidal ideas.    All other systems reviewed and are negative.        Past Medical History:   Diagnosis Date   • Diverticulitis of colon    • ECM (erythema chronicum migrans) 06/17/2015    Per [employer]         Current Outpatient Medications:   •  cephalexin (KEFLEX) 500 mg capsule, Take 1 capsule (500 mg total) by mouth 2 (two) times a day for 7 days, Disp: 14 capsule, Rfl: 0  •  desogestrel-ethinyl estradiol (APRI) 0.15-30 MG-MCG per tablet, Take 1 tablet by mouth daily, Disp: , Rfl:     Allergies   Allergen Reactions   • Sulfamethoxazole-Trimethoprim Rash       Social History   Past Surgical History:   Procedure Laterality Date   • TOOTH EXTRACTION       History reviewed. No pertinent family history.    Objective:  /72 (BP Location: Left arm, Patient Position: Sitting, Cuff Size: Large)   Pulse 83   Temp 97.9 °F (36.6 °C) (Temporal)   Resp 20   Ht 5' 6\" (1.676 m)   Wt " 119 kg (263 lb)   SpO2 98%   BMI 42.45 kg/m²     Recent Results (from the past 1344 hour(s))   POCT urine dip auto non-scope    Collection Time: 05/16/24  9:03 AM   Result Value Ref Range     COLOR,UA clear     CLARITY,UA cloudy     SPECIFIC GRAVITY,UA 1.015      PH,UA 5.5     LEUKOCYTE ESTERASE,UA small     NITRITE,UA neg     GLUCOSE, UA neg     KETONES,UA neg     BILIRUBIN,UA neg     BLOOD,UA small     POCT URINE PROTEIN neg     SL AMB POCT UROBILINOGEN 0.2 E.U./dl             Physical Exam  Vitals and nursing note reviewed.   Constitutional:       General: She is not in acute distress.     Appearance: She is well-developed. She is not diaphoretic.   HENT:      Head: Normocephalic and atraumatic.   Eyes:      General:         Right eye: No discharge.         Left eye: No discharge.      Conjunctiva/sclera: Conjunctivae normal.      Pupils: Pupils are equal, round, and reactive to light.   Neck:      Thyroid: No thyromegaly.      Vascular: No JVD.   Cardiovascular:      Rate and Rhythm: Normal rate and regular rhythm.      Heart sounds: Normal heart sounds. No murmur heard.     No friction rub. No gallop.   Pulmonary:      Effort: Pulmonary effort is normal. No respiratory distress.      Breath sounds: Normal breath sounds. No wheezing or rales.   Chest:      Chest wall: No tenderness.   Abdominal:      General: There is no distension.      Palpations: Abdomen is soft.      Tenderness: There is no abdominal tenderness.   Musculoskeletal:         General: No tenderness or deformity. Normal range of motion.        Arms:       Cervical back: Normal range of motion and neck supple.   Lymphadenopathy:      Cervical: No cervical adenopathy.   Skin:     General: Skin is warm and dry.      Coloration: Skin is not pale.      Findings: No erythema or rash.   Neurological:      Mental Status: She is alert and oriented to person, place, and time.      Cranial Nerves: No cranial nerve deficit.      Coordination: Coordination  normal.   Psychiatric:         Behavior: Behavior normal.         Thought Content: Thought content normal.         Judgment: Judgment normal.

## 2024-05-16 NOTE — ASSESSMENT & PLAN NOTE
Will start Keflex twice daily for 7 days.  Discussed increasing oral hydration.  Will follow-up urine culture.

## 2024-05-17 LAB — BACTERIA UR CULT: NORMAL

## 2024-06-15 PROBLEM — N30.00 ACUTE CYSTITIS WITHOUT HEMATURIA: Status: RESOLVED | Noted: 2024-05-16 | Resolved: 2024-06-15

## 2024-06-24 ENCOUNTER — RA CDI HCC (OUTPATIENT)
Dept: OTHER | Facility: HOSPITAL | Age: 29
End: 2024-06-24

## 2024-06-24 PROBLEM — Z00.00 ANNUAL PHYSICAL EXAM: Status: RESOLVED | Noted: 2023-06-28 | Resolved: 2024-06-24

## 2024-07-01 ENCOUNTER — OFFICE VISIT (OUTPATIENT)
Dept: INTERNAL MEDICINE CLINIC | Facility: OTHER | Age: 29
End: 2024-07-01
Payer: COMMERCIAL

## 2024-07-01 VITALS
RESPIRATION RATE: 18 BRPM | HEIGHT: 66 IN | BODY MASS INDEX: 42.17 KG/M2 | WEIGHT: 262.4 LBS | TEMPERATURE: 98.6 F | HEART RATE: 94 BPM | OXYGEN SATURATION: 99 % | DIASTOLIC BLOOD PRESSURE: 82 MMHG | SYSTOLIC BLOOD PRESSURE: 132 MMHG

## 2024-07-01 DIAGNOSIS — Z00.00 ANNUAL PHYSICAL EXAM: Primary | ICD-10-CM

## 2024-07-01 DIAGNOSIS — L70.0 ACNE VULGARIS: ICD-10-CM

## 2024-07-01 DIAGNOSIS — Z13.220 ENCOUNTER FOR LIPID SCREENING FOR CARDIOVASCULAR DISEASE: ICD-10-CM

## 2024-07-01 DIAGNOSIS — Z13.6 ENCOUNTER FOR LIPID SCREENING FOR CARDIOVASCULAR DISEASE: ICD-10-CM

## 2024-07-01 DIAGNOSIS — Z13.1 DIABETES MELLITUS SCREENING: ICD-10-CM

## 2024-07-01 DIAGNOSIS — Z13.228 SCREENING FOR METABOLIC DISORDER: ICD-10-CM

## 2024-07-01 PROCEDURE — 99395 PREV VISIT EST AGE 18-39: CPT | Performed by: INTERNAL MEDICINE

## 2024-07-01 RX ORDER — CLINDAMYCIN AND BENZOYL PEROXIDE 10; 50 MG/G; MG/G
GEL TOPICAL 2 TIMES DAILY
Qty: 50 G | Refills: 2 | Status: SHIPPED | OUTPATIENT
Start: 2024-07-01

## 2024-07-01 NOTE — PROGRESS NOTES
Adult Annual Physical  Name: Teena Lundy      : 1995      MRN: [id number]  Encounter Provider: Bebo Grey MD  Encounter Date: 2024   Encounter department: [facility]    Assessment & Plan   1. Annual physical exam  Assessment & Plan:  Discussed diet and exercise.   Continue follow up with GYN.   Discussed skin cancer screening. Discussed use of sun block/screen.   Labs ordered for screening.     2. Acne vulgaris  -     clindamycin-benzoyl peroxide (BENZACLIN) gel; Apply topically 2 (two) times a day Apply to the affected area twice daily.  3. Screening for metabolic disorder  -     CBC; Future  -     Comprehensive metabolic panel; Future  -     Lipid panel; Future  -     Hemoglobin A1C; Future  4. Encounter for lipid screening for cardiovascular disease  -     Comprehensive metabolic panel; Future  -     Lipid panel; Future  5. Diabetes mellitus screening  -     Comprehensive metabolic panel; Future  -     Hemoglobin A1C; Future    Immunizations and preventive care screenings were discussed with patient today. Appropriate education was printed on patient's after visit summary.    Counseling:  Alcohol/drug use: discussed moderation in alcohol intake, the recommendations for healthy alcohol use, and avoidance of illicit drug use.  Dental Health: discussed importance of regular tooth brushing, flossing, and dental visits.  Injury prevention: discussed safety/seat belts, safety helmets, smoke detectors, carbon dioxide detectors, and smoking near bedding or upholstery.  Sexual health: discussed sexually transmitted diseases, partner selection, use of condoms, avoidance of unintended pregnancy, and contraceptive alternatives.  Exercise: the importance of regular exercise/physical activity was discussed. Recommend exercise 3-5 times per week for at least 30 minutes.          History of Present Illness     Adult Annual Physical:  Patient presents for annual  "physical.     Diet and Physical Activity:  - Diet/Nutrition: well balanced diet.  - Exercise: no formal exercise.    General Health:  - Sleep: sleeps well, 7-8 hours of sleep on average and 4-6 hours of sleep on average.  - Hearing: normal hearing bilateral ears.  - Vision: no vision problems, goes for regular eye exams and wears glasses.  - Dental: brushes teeth twice daily and no dental visits for > 1 year.    /GYN Health:  - Follows with GYN: yes.   - Menopause: premenopausal.   - History of STDs: no    Review of Systems   Constitutional:  Negative for activity change, appetite change, chills, diaphoresis, fatigue and fever.   HENT:  Negative for congestion, postnasal drip, rhinorrhea, sinus pressure, sinus pain, sneezing and sore throat.    Eyes:  Negative for visual disturbance.   Respiratory:  Negative for apnea, cough, choking, chest tightness, shortness of breath and wheezing.    Cardiovascular:  Negative for chest pain, palpitations and leg swelling.   Gastrointestinal:  Negative for abdominal distention, abdominal pain, anal bleeding, blood in stool, constipation, diarrhea, nausea and vomiting.   Endocrine: Negative for cold intolerance and heat intolerance.   Genitourinary:  Negative for difficulty urinating, dysuria and hematuria.   Musculoskeletal: Negative.    Skin: Negative.    Neurological:  Negative for dizziness, weakness, light-headedness, numbness and headaches.   Hematological:  Negative for adenopathy.   Psychiatric/Behavioral:  Negative for agitation, sleep disturbance and suicidal ideas.    All other systems reviewed and are negative.    Medical History Reviewed by provider this encounter:  Tobacco  Allergies  Meds  Problems  Med Hx  Surg Hx  Fam Hx         Objective     /82 (BP Location: Left arm, Patient Position: Sitting, Cuff Size: Large)   Pulse 94   Temp 98.6 °F (37 °C) (Temporal)   Resp 18   Ht 5' 6\" (1.676 m)   Wt 119 kg (262 lb 6.4 oz)   SpO2 99%   BMI 42.35 " kg/m²     Physical Exam  Vitals and nursing note reviewed.   Constitutional:       General: She is not in acute distress.     Appearance: She is well-developed. She is not diaphoretic.   HENT:      Head: Normocephalic and atraumatic.   Eyes:      General:         Right eye: No discharge.         Left eye: No discharge.      Conjunctiva/sclera: Conjunctivae normal.      Pupils: Pupils are equal, round, and reactive to light.   Neck:      Thyroid: No thyromegaly.      Vascular: No JVD.   Cardiovascular:      Rate and Rhythm: Normal rate and regular rhythm.      Heart sounds: Normal heart sounds. No murmur heard.     No friction rub. No gallop.   Pulmonary:      Effort: Pulmonary effort is normal. No respiratory distress.      Breath sounds: Normal breath sounds. No wheezing or rales.   Chest:      Chest wall: No tenderness.       Abdominal:      General: There is no distension.      Palpations: Abdomen is soft.      Tenderness: There is no abdominal tenderness.   Musculoskeletal:         General: No tenderness or deformity. Normal range of motion.      Cervical back: Normal range of motion and neck supple.   Lymphadenopathy:      Cervical: No cervical adenopathy.   Skin:     General: Skin is warm and dry.      Coloration: Skin is not pale.      Findings: No erythema or rash.   Neurological:      Mental Status: She is alert and oriented to person, place, and time.      Cranial Nerves: No cranial nerve deficit.      Coordination: Coordination normal.   Psychiatric:         Behavior: Behavior normal.         Thought Content: Thought content normal.         Judgment: Judgment normal.       Administrative Statements   I have spent a total time of 30 minutes in caring for this patient on the day of the visit/encounter including Diagnostic results, Prognosis, Risks and benefits of tx options, Instructions for management, Patient and family education, Importance of tx compliance, Risk factor reductions, Impressions,  Counseling / Coordination of care, Documenting in the medical record, Reviewing / ordering tests, medicine, procedures  , and Obtaining or reviewing history  .

## 2024-07-01 NOTE — ASSESSMENT & PLAN NOTE
Discussed diet and exercise.   Continue follow up with GYN.   Discussed skin cancer screening. Discussed use of sun block/screen.   Labs ordered for screening.

## 2024-11-25 ENCOUNTER — OFFICE VISIT (OUTPATIENT)
Dept: INTERNAL MEDICINE CLINIC | Facility: OTHER | Age: 29
End: 2024-11-25
Payer: COMMERCIAL

## 2024-11-25 VITALS
HEIGHT: 66 IN | HEART RATE: 110 BPM | BODY MASS INDEX: 40.31 KG/M2 | WEIGHT: 250.8 LBS | RESPIRATION RATE: 20 BRPM | SYSTOLIC BLOOD PRESSURE: 118 MMHG | OXYGEN SATURATION: 97 % | DIASTOLIC BLOOD PRESSURE: 82 MMHG | TEMPERATURE: 100.5 F

## 2024-11-25 DIAGNOSIS — N30.00 ACUTE CYSTITIS WITHOUT HEMATURIA: ICD-10-CM

## 2024-11-25 DIAGNOSIS — J06.9 ACUTE URI: Primary | ICD-10-CM

## 2024-11-25 LAB
SL AMB  POCT GLUCOSE, UA: NORMAL
SL AMB LEUKOCYTE ESTERASE,UA: NORMAL
SL AMB POCT BILIRUBIN,UA: NORMAL
SL AMB POCT BLOOD,UA: NORMAL
SL AMB POCT CLARITY,UA: CLEAR
SL AMB POCT COLOR,UA: YELLOW
SL AMB POCT KETONES,UA: NORMAL
SL AMB POCT NITRITE,UA: NORMAL
SL AMB POCT PH,UA: 6.5
SL AMB POCT SPECIFIC GRAVITY,UA: 1.02
SL AMB POCT URINE PROTEIN: NORMAL
SL AMB POCT UROBILINOGEN: NORMAL

## 2024-11-25 PROCEDURE — 99213 OFFICE O/P EST LOW 20 MIN: CPT | Performed by: INTERNAL MEDICINE

## 2024-11-25 PROCEDURE — 81003 URINALYSIS AUTO W/O SCOPE: CPT | Performed by: INTERNAL MEDICINE

## 2024-11-25 NOTE — PROGRESS NOTES
Assessment/Plan:    Acute URI  Will prescribe Augmentin twice a day for 5 days.  Discussed over-the-counter medications to take for symptom relief.  She is to contact our office for persistent or worsening symptoms.    Acute cystitis without hematuria  Urine dip was negative for leukocytes and nitrites.  Trace blood. Monitor clinically.        Diagnoses and all orders for this visit:    Acute URI  -     amoxicillin-clavulanate (AUGMENTIN) 875-125 mg per tablet; Take 1 tablet by mouth every 12 (twelve) hours for 5 days    Acute cystitis without hematuria  -     amoxicillin-clavulanate (AUGMENTIN) 875-125 mg per tablet; Take 1 tablet by mouth every 12 (twelve) hours for 5 days                  Subjective:      Patient ID: Teena Lundy is a 29 y.o. female.    Chief Complaint   Patient presents with    Fever    Urinary Tract Infection     RETENTION - symptoms started Saturday     Back Pain    Dizziness    Headache    Cough    Cold Like Symptoms     Was also sick about 1.5 months ago and still has cough       Teena Lundy I see today with concern for UTI and URI symptoms.  She has been experiencing UTI symptoms since 2 days.  She is also experiencing URI symptoms which have been persistent for 6 weeks    Fever  Associated symptoms include coughing and headaches. Pertinent negatives include no abdominal pain, chest pain, chills, congestion, diaphoresis, fatigue, fever, nausea, numbness, sore throat, vomiting or weakness.   Urinary Tract Infection   This is a new problem. The current episode started in the past 7 days. Associated symptoms include frequency, hesitancy and urgency. Pertinent negatives include no chills, hematuria, nausea or vomiting. She has tried increased fluids for the symptoms.   Back Pain  Associated symptoms include headaches. Pertinent negatives include no abdominal pain, chest pain, dysuria, fever, numbness or weakness.   Dizziness  Associated symptoms include coughing and headaches.  Pertinent negatives include no abdominal pain, chest pain, chills, congestion, diaphoresis, fatigue, fever, nausea, numbness, sore throat, vomiting or weakness.   Headache  Cough  Associated symptoms include headaches. Pertinent negatives include no chest pain, chills, fever, postnasal drip, rhinorrhea, sore throat, shortness of breath or wheezing.   URI   This is a new problem. The current episode started more than 1 month ago. The problem has been unchanged. Associated symptoms include coughing and headaches. Pertinent negatives include no abdominal pain, chest pain, congestion, diarrhea, dysuria, nausea, rhinorrhea, sinus pain, sneezing, sore throat, vomiting or wheezing. She has tried decongestant for the symptoms. The treatment provided mild relief.       The following portions of the patient's history were reviewed and updated as appropriate: allergies, current medications, past family history, past medical history, past social history, past surgical history, and problem list.    Review of Systems   Constitutional:  Negative for activity change, appetite change, chills, diaphoresis, fatigue and fever.   HENT:  Negative for congestion, postnasal drip, rhinorrhea, sinus pressure, sinus pain, sneezing and sore throat.    Eyes:  Negative for visual disturbance.   Respiratory:  Positive for cough. Negative for apnea, choking, chest tightness, shortness of breath and wheezing.    Cardiovascular:  Negative for chest pain, palpitations and leg swelling.   Gastrointestinal:  Negative for abdominal distention, abdominal pain, anal bleeding, blood in stool, constipation, diarrhea, nausea and vomiting.   Endocrine: Negative for cold intolerance and heat intolerance.   Genitourinary:  Positive for frequency, hesitancy and urgency. Negative for difficulty urinating, dysuria and hematuria.   Musculoskeletal:  Positive for back pain.   Skin: Negative.    Neurological:  Positive for dizziness and headaches. Negative for  "weakness, light-headedness and numbness.   Hematological:  Negative for adenopathy.   Psychiatric/Behavioral:  Negative for agitation, sleep disturbance and suicidal ideas.    All other systems reviewed and are negative.        Past Medical History:   Diagnosis Date    Annual physical exam 06/28/2023    Diverticulitis of colon     ECM (erythema chronicum migrans) 06/17/2015    Per [employer]         Current Outpatient Medications:     amoxicillin-clavulanate (AUGMENTIN) 875-125 mg per tablet, Take 1 tablet by mouth every 12 (twelve) hours for 5 days, Disp: 10 tablet, Rfl: 0    clindamycin-benzoyl peroxide (BENZACLIN) gel, Apply topically 2 (two) times a day Apply to the affected area twice daily., Disp: 50 g, Rfl: 2    Allergies   Allergen Reactions    Sulfamethoxazole-Trimethoprim Rash       Social History   Past Surgical History:   Procedure Laterality Date    TOOTH EXTRACTION       History reviewed. No pertinent family history.    Objective:  /82 (BP Location: Left arm, Patient Position: Sitting, Cuff Size: Large)   Pulse (!) 110   Temp 100.5 °F (38.1 °C) (Temporal)   Resp 20   Ht 5' 6\" (1.676 m)   Wt 114 kg (250 lb 12.8 oz)   SpO2 97%   BMI 40.48 kg/m²     No results found for this or any previous visit (from the past 8 weeks).         Physical Exam  Vitals and nursing note reviewed.   Constitutional:       General: She is not in acute distress.     Appearance: She is well-developed. She is not diaphoretic.   HENT:      Head: Normocephalic and atraumatic.   Eyes:      General:         Right eye: No discharge.         Left eye: No discharge.      Conjunctiva/sclera: Conjunctivae normal.      Pupils: Pupils are equal, round, and reactive to light.   Neck:      Thyroid: No thyromegaly.      Vascular: No JVD.   Cardiovascular:      Rate and Rhythm: Normal rate and regular rhythm.      Heart sounds: Normal heart sounds. No murmur heard.     No friction rub. No gallop.   Pulmonary:      Effort: Pulmonary " effort is normal. No respiratory distress.      Breath sounds: Normal breath sounds. No wheezing or rales.   Chest:      Chest wall: No tenderness.   Abdominal:      General: There is no distension.      Palpations: Abdomen is soft.      Tenderness: There is no abdominal tenderness.   Musculoskeletal:         General: No tenderness or deformity. Normal range of motion.      Cervical back: Normal range of motion and neck supple.   Lymphadenopathy:      Cervical: No cervical adenopathy.   Skin:     General: Skin is warm and dry.      Coloration: Skin is not pale.      Findings: No erythema or rash.   Neurological:      Mental Status: She is alert and oriented to person, place, and time.      Cranial Nerves: No cranial nerve deficit.      Coordination: Coordination normal.   Psychiatric:         Behavior: Behavior normal.         Thought Content: Thought content normal.         Judgment: Judgment normal.

## 2024-11-25 NOTE — ASSESSMENT & PLAN NOTE
Will prescribe Augmentin twice a day for 5 days.  Discussed over-the-counter medications to take for symptom relief.  She is to contact our office for persistent or worsening symptoms.

## 2024-11-30 ENCOUNTER — OFFICE VISIT (OUTPATIENT)
Dept: URGENT CARE | Age: 29
End: 2024-11-30
Payer: COMMERCIAL

## 2024-11-30 ENCOUNTER — APPOINTMENT (OUTPATIENT)
Dept: RADIOLOGY | Age: 29
End: 2024-11-30
Payer: COMMERCIAL

## 2024-11-30 VITALS
DIASTOLIC BLOOD PRESSURE: 78 MMHG | OXYGEN SATURATION: 98 % | TEMPERATURE: 98.9 F | SYSTOLIC BLOOD PRESSURE: 124 MMHG | HEART RATE: 102 BPM | RESPIRATION RATE: 18 BRPM

## 2024-11-30 DIAGNOSIS — R35.0 FREQUENCY OF URINATION: ICD-10-CM

## 2024-11-30 DIAGNOSIS — J18.9 PNEUMONIA DUE TO INFECTIOUS ORGANISM, UNSPECIFIED LATERALITY, UNSPECIFIED PART OF LUNG: Primary | ICD-10-CM

## 2024-11-30 DIAGNOSIS — Z20.822 ENCOUNTER FOR LABORATORY TESTING FOR COVID-19 VIRUS: ICD-10-CM

## 2024-11-30 DIAGNOSIS — N39.0 URINARY TRACT INFECTION WITHOUT HEMATURIA, SITE UNSPECIFIED: ICD-10-CM

## 2024-11-30 LAB
SARS-COV-2 AG UPPER RESP QL IA: NEGATIVE
SL AMB  POCT GLUCOSE, UA: NEGATIVE
SL AMB LEUKOCYTE ESTERASE,UA: NEGATIVE
SL AMB POCT BILIRUBIN,UA: NEGATIVE
SL AMB POCT BLOOD,UA: ABNORMAL
SL AMB POCT CLARITY,UA: CLEAR
SL AMB POCT COLOR,UA: YELLOW
SL AMB POCT KETONES,UA: NEGATIVE
SL AMB POCT NITRITE,UA: NEGATIVE
SL AMB POCT PH,UA: 5
SL AMB POCT SPECIFIC GRAVITY,UA: 1.03
SL AMB POCT URINE HCG: NEGATIVE
SL AMB POCT URINE PROTEIN: NEGATIVE
SL AMB POCT UROBILINOGEN: 0.2
VALID CONTROL: NORMAL

## 2024-11-30 PROCEDURE — 81025 URINE PREGNANCY TEST: CPT | Performed by: PHYSICIAN ASSISTANT

## 2024-11-30 PROCEDURE — 87811 SARS-COV-2 COVID19 W/OPTIC: CPT | Performed by: PHYSICIAN ASSISTANT

## 2024-11-30 PROCEDURE — 71046 X-RAY EXAM CHEST 2 VIEWS: CPT

## 2024-11-30 PROCEDURE — 87086 URINE CULTURE/COLONY COUNT: CPT | Performed by: PHYSICIAN ASSISTANT

## 2024-11-30 PROCEDURE — G0382 LEV 3 HOSP TYPE B ED VISIT: HCPCS | Performed by: PHYSICIAN ASSISTANT

## 2024-11-30 PROCEDURE — 81002 URINALYSIS NONAUTO W/O SCOPE: CPT | Performed by: PHYSICIAN ASSISTANT

## 2024-11-30 PROCEDURE — S9083 URGENT CARE CENTER GLOBAL: HCPCS | Performed by: PHYSICIAN ASSISTANT

## 2024-11-30 RX ORDER — PREDNISONE 10 MG/1
TABLET ORAL
Qty: 20 TABLET | Refills: 0 | Status: SHIPPED | OUTPATIENT
Start: 2024-11-30

## 2024-11-30 RX ORDER — LEVOFLOXACIN 500 MG/1
500 TABLET, FILM COATED ORAL EVERY 24 HOURS
Qty: 7 TABLET | Refills: 0 | Status: SHIPPED | OUTPATIENT
Start: 2024-11-30 | End: 2024-12-07

## 2024-11-30 RX ORDER — ALBUTEROL SULFATE 90 UG/1
2 INHALANT RESPIRATORY (INHALATION) EVERY 6 HOURS PRN
Qty: 8.5 G | Refills: 0 | Status: SHIPPED | OUTPATIENT
Start: 2024-11-30

## 2024-11-30 RX ORDER — BENZONATATE 100 MG/1
100 CAPSULE ORAL 3 TIMES DAILY PRN
Qty: 20 CAPSULE | Refills: 0 | Status: SHIPPED | OUTPATIENT
Start: 2024-11-30

## 2024-11-30 NOTE — LETTER
November 30, 2024     Patient: Teena Lundy   YOB: 1995   Date of Visit: 11/30/2024       To Whom It May Concern:    It is my medical opinion that Teena Lundy may return to work on 12/2/2024 .    If you have any questions or concerns, please don't hesitate to call.         Sincerely,        Chase Ordaz Jr, PA-C    CC: No Recipients

## 2024-11-30 NOTE — PROGRESS NOTES
[facility]        NAME: Teena Lundy is a 29 y.o. female  : 1995    MRN: [id number]  DATE: 2024  TIME: 11:35 AM    /78   Pulse 102   Temp 98.9 °F (37.2 °C)   Resp 18   SpO2 98%     Assessment and Plan   Pneumonia due to infectious organism, unspecified laterality, unspecified part of lung [J18.9]  1. Pneumonia due to infectious organism, unspecified laterality, unspecified part of lung  POCT urine dip    XR chest pa and lateral    Urine culture    POCT urine HCG    Urine culture    levofloxacin (LEVAQUIN) 500 mg tablet    benzonatate (TESSALON PERLES) 100 mg capsule    albuterol (ProAir HFA) 90 mcg/act inhaler    predniSONE 10 mg tablet      2. Urinary tract infection without hematuria, site unspecified  Poct Covid 19 Rapid Antigen Test    levofloxacin (LEVAQUIN) 500 mg tablet    benzonatate (TESSALON PERLES) 100 mg capsule    albuterol (ProAir HFA) 90 mcg/act inhaler    predniSONE 10 mg tablet      3. Encounter for laboratory testing for COVID-19 virus              Patient Instructions       Follow up with PCP in 3-5 days.  Proceed to  ER if symptoms worsen.    Chief Complaint     Chief Complaint   Patient presents with    Possible UTI     Patient reports frequency of urination, did see blood in urine.    Cough     Patient reports cough and congestion. Finished augmentin this a.m. Denies fever and chills.         History of Present Illness       Pt with continued cough x 1 1/2 months, pt with urine urgency also,  pt with 5 days of augmentin not helping     Cough  This is a new problem. The current episode started more than 1 month ago. The problem has been waxing and waning. The problem occurs constantly. The cough is Non-productive and productive of sputum. Associated symptoms include a fever, headaches, myalgias, nasal congestion, postnasal drip, rhinorrhea, a sore throat, shortness of breath, sweats and wheezing. Pertinent negatives include no chest pain, chills,  ear congestion, ear pain, rash or weight loss. The symptoms are aggravated by cold air and exercise. Risk factors for lung disease include animal exposure.       Review of Systems   Review of Systems   Constitutional:  Positive for fever. Negative for chills and weight loss.   HENT:  Positive for postnasal drip, rhinorrhea and sore throat. Negative for ear pain.    Eyes: Negative.    Respiratory:  Positive for cough, shortness of breath and wheezing.    Cardiovascular: Negative.  Negative for chest pain.   Gastrointestinal: Negative.    Endocrine: Negative.    Genitourinary: Negative.    Musculoskeletal:  Positive for myalgias.   Skin: Negative.  Negative for rash.   Allergic/Immunologic: Negative.    Neurological:  Positive for headaches.   Hematological: Negative.    Psychiatric/Behavioral: Negative.     All other systems reviewed and are negative.        Current Medications       Current Outpatient Medications:     albuterol (ProAir HFA) 90 mcg/act inhaler, Inhale 2 puffs every 6 (six) hours as needed for wheezing, Disp: 8.5 g, Rfl: 0    benzonatate (TESSALON PERLES) 100 mg capsule, Take 1 capsule (100 mg total) by mouth 3 (three) times a day as needed for cough, Disp: 20 capsule, Rfl: 0    levofloxacin (LEVAQUIN) 500 mg tablet, Take 1 tablet (500 mg total) by mouth every 24 hours for 7 days, Disp: 7 tablet, Rfl: 0    predniSONE 10 mg tablet, 4 tabs po qd x 2 days then 3 tabs po qd x 2 days then 2 tabs po qd x 2 days then 1 tab po qd x 2 days, Disp: 20 tablet, Rfl: 0    amoxicillin-clavulanate (AUGMENTIN) 875-125 mg per tablet, Take 1 tablet by mouth every 12 (twelve) hours for 5 days (Patient not taking: Reported on 11/30/2024), Disp: 10 tablet, Rfl: 0    clindamycin-benzoyl peroxide (BENZACLIN) gel, Apply topically 2 (two) times a day Apply to the affected area twice daily. (Patient not taking: Reported on 11/30/2024), Disp: 50 g, Rfl: 2    Current Allergies     Allergies as of 11/30/2024 - Reviewed 11/30/2024    Allergen Reaction Noted    Sulfamethoxazole-trimethoprim Rash 04/06/2016            The following portions of the patient's history were reviewed and updated as appropriate: allergies, current medications, past family history, past medical history, past social history, past surgical history and problem list.     Past Medical History:   Diagnosis Date    Annual physical exam 06/28/2023    Diverticulitis of colon     ECM (erythema chronicum migrans) 06/17/2015    Per [employer]       Past Surgical History:   Procedure Laterality Date    TOOTH EXTRACTION         No family history on file.      Medications have been verified.        Objective   /78   Pulse 102   Temp 98.9 °F (37.2 °C)   Resp 18   SpO2 98%        Physical Exam     Physical Exam  Vitals and nursing note reviewed.   Constitutional:       Appearance: Normal appearance. She is normal weight.   HENT:      Head: Normocephalic and atraumatic.      Right Ear: Tympanic membrane, ear canal and external ear normal.      Left Ear: Tympanic membrane, ear canal and external ear normal.      Nose: Congestion and rhinorrhea present.      Comments: Boggy mucosa  yellow d/c      Mouth/Throat:      Mouth: Mucous membranes are moist.      Pharynx: Oropharynx is clear.   Eyes:      Extraocular Movements: Extraocular movements intact.      Conjunctiva/sclera: Conjunctivae normal.      Pupils: Pupils are equal, round, and reactive to light.   Cardiovascular:      Rate and Rhythm: Normal rate and regular rhythm.      Pulses: Normal pulses.      Heart sounds: Normal heart sounds.   Pulmonary:      Effort: Pulmonary effort is normal.      Comments: Coarse sound right lower  Abdominal:      General: Abdomen is flat. Bowel sounds are normal.      Palpations: Abdomen is soft.   Musculoskeletal:         General: Normal range of motion.      Cervical back: Normal range of motion and neck supple.   Neurological:      Mental Status: She is alert and oriented to person,  place, and time.   Psychiatric:         Behavior: Behavior normal.

## 2024-12-03 LAB — BACTERIA UR CULT: NORMAL

## 2024-12-25 PROBLEM — N30.00 ACUTE CYSTITIS WITHOUT HEMATURIA: Status: RESOLVED | Noted: 2024-05-16 | Resolved: 2024-12-25

## 2024-12-25 PROBLEM — J06.9 ACUTE URI: Status: RESOLVED | Noted: 2019-06-04 | Resolved: 2024-12-25

## 2025-01-23 ENCOUNTER — OFFICE VISIT (OUTPATIENT)
Dept: INTERNAL MEDICINE CLINIC | Facility: OTHER | Age: 30
End: 2025-01-23
Payer: COMMERCIAL

## 2025-01-23 ENCOUNTER — RESULTS FOLLOW-UP (OUTPATIENT)
Dept: INTERNAL MEDICINE CLINIC | Facility: OTHER | Age: 30
End: 2025-01-23

## 2025-01-23 ENCOUNTER — HOSPITAL ENCOUNTER (OUTPATIENT)
Dept: RADIOLOGY | Facility: IMAGING CENTER | Age: 30
End: 2025-01-23
Payer: COMMERCIAL

## 2025-01-23 VITALS
HEIGHT: 66 IN | TEMPERATURE: 98.3 F | OXYGEN SATURATION: 98 % | SYSTOLIC BLOOD PRESSURE: 124 MMHG | DIASTOLIC BLOOD PRESSURE: 78 MMHG | WEIGHT: 247.2 LBS | BODY MASS INDEX: 39.73 KG/M2 | HEART RATE: 95 BPM

## 2025-01-23 DIAGNOSIS — R10.11 RUQ PAIN: ICD-10-CM

## 2025-01-23 DIAGNOSIS — R10.11 RUQ PAIN: Primary | ICD-10-CM

## 2025-01-23 PROCEDURE — 99214 OFFICE O/P EST MOD 30 MIN: CPT | Performed by: NURSE PRACTITIONER

## 2025-01-23 PROCEDURE — 76705 ECHO EXAM OF ABDOMEN: CPT

## 2025-01-23 NOTE — PROGRESS NOTES
Assessment/Plan:    Problem List Items Addressed This Visit       RUQ pain - Primary    Acute RUQ pain with positive Wang sign on exam  Check STAT RUQ US  Follow results          Relevant Orders    US right upper quadrant            M*Modal software was used to dictate this note.  It may contain errors with dictating incorrect words or incorrect spelling. Please contact the provider directly with any questions.    Subjective:      Patient ID: Teena Lundy is a 29 y.o. female.    HPI    Patient presents today with concern for acute RUQ pain. Pain started 2 days ago. Pain is constant, rates it 2/10. Previously was 3-4 at rest. With movement pain is worse to 5-6. She is unable to lay on her right side due to the pain.   No change with eating.   Has hx of reflux but not in the last few days.   She denies any fever or chills  No nausea, vomiting or diarrhea   Denies any urinary symptoms   She notes she has a hx of liver lesion seen on US in the past that was then no longer seen on MRI    The following portions of the patient's history were reviewed and updated as appropriate: allergies, current medications, past family history, past medical history, past social history, past surgical history, and problem list.    Review of Systems   Constitutional:  Negative for chills and fever.   Gastrointestinal:  Positive for abdominal pain. Negative for blood in stool, constipation, diarrhea, nausea and vomiting.   Genitourinary:  Negative for dysuria, flank pain, frequency and hematuria.         Past Medical History:   Diagnosis Date    Allergic N/A    Bactrim-Rash    Anemia N/A    N/A    Annual physical exam 06/28/2023    Diverticulitis of colon     ECM (erythema chronicum migrans) 06/17/2015    Per [employer]    Obesity N/A    N/A         Current Outpatient Medications:     clindamycin-benzoyl peroxide (BENZACLIN) gel, Apply topically 2 (two) times a day Apply to the affected area twice daily., Disp: 50 g, Rfl:  "2    Allergies   Allergen Reactions    Sulfamethoxazole-Trimethoprim Rash       Social History   Past Surgical History:   Procedure Laterality Date    TOOTH EXTRACTION       Family History   Problem Relation Age of Onset    Glaucoma Father     Hypertension Maternal Grandmother        Objective:  /78 (BP Location: Left arm, Patient Position: Sitting, Cuff Size: Large)   Pulse 95   Temp 98.3 °F (36.8 °C) (Temporal)   Ht 5' 6\" (1.676 m)   Wt 112 kg (247 lb 3.2 oz)   SpO2 98%   BMI 39.90 kg/m²      Physical Exam  Vitals reviewed.   Constitutional:       General: She is not in acute distress.     Appearance: Normal appearance. She is obese. She is not diaphoretic.   HENT:      Head: Normocephalic and atraumatic.   Eyes:      Extraocular Movements: Extraocular movements intact.      Conjunctiva/sclera: Conjunctivae normal.      Pupils: Pupils are equal, round, and reactive to light.   Cardiovascular:      Rate and Rhythm: Normal rate and regular rhythm.      Heart sounds: Normal heart sounds. No murmur heard.  Pulmonary:      Effort: Pulmonary effort is normal. No respiratory distress.      Breath sounds: Normal breath sounds. No wheezing, rhonchi or rales.   Abdominal:      General: Bowel sounds are normal. There is no distension.      Palpations: Abdomen is soft. There is no mass.      Tenderness: There is abdominal tenderness. There is no right CVA tenderness. Positive signs include Wang's sign.   Neurological:      Mental Status: She is alert and oriented to person, place, and time. Mental status is at baseline.   Psychiatric:         Mood and Affect: Mood normal.         Behavior: Behavior normal.           "

## 2025-07-07 ENCOUNTER — OFFICE VISIT (OUTPATIENT)
Dept: INTERNAL MEDICINE CLINIC | Facility: OTHER | Age: 30
End: 2025-07-07
Payer: COMMERCIAL

## 2025-07-07 VITALS
HEART RATE: 85 BPM | SYSTOLIC BLOOD PRESSURE: 124 MMHG | HEIGHT: 66 IN | TEMPERATURE: 98 F | OXYGEN SATURATION: 97 % | WEIGHT: 243 LBS | BODY MASS INDEX: 39.05 KG/M2 | DIASTOLIC BLOOD PRESSURE: 76 MMHG

## 2025-07-07 DIAGNOSIS — Z13.1 DIABETES MELLITUS SCREENING: ICD-10-CM

## 2025-07-07 DIAGNOSIS — L30.9 DERMATITIS: ICD-10-CM

## 2025-07-07 DIAGNOSIS — Z13.6 ENCOUNTER FOR LIPID SCREENING FOR CARDIOVASCULAR DISEASE: ICD-10-CM

## 2025-07-07 DIAGNOSIS — Z13.220 ENCOUNTER FOR LIPID SCREENING FOR CARDIOVASCULAR DISEASE: ICD-10-CM

## 2025-07-07 DIAGNOSIS — Z56.6 STRESS AT WORK: Primary | ICD-10-CM

## 2025-07-07 DIAGNOSIS — Z00.00 ANNUAL PHYSICAL EXAM: ICD-10-CM

## 2025-07-07 PROBLEM — R10.11 RUQ PAIN: Status: RESOLVED | Noted: 2025-01-23 | Resolved: 2025-07-07

## 2025-07-07 PROCEDURE — 99395 PREV VISIT EST AGE 18-39: CPT | Performed by: INTERNAL MEDICINE

## 2025-07-07 PROCEDURE — 99213 OFFICE O/P EST LOW 20 MIN: CPT | Performed by: INTERNAL MEDICINE

## 2025-07-07 RX ORDER — HYDROXYZINE HYDROCHLORIDE 25 MG/1
25 TABLET, FILM COATED ORAL EVERY 6 HOURS PRN
Qty: 30 TABLET | Refills: 0 | Status: SHIPPED | OUTPATIENT
Start: 2025-07-07

## 2025-07-07 RX ORDER — CLOBETASOL PROPIONATE 0.5 MG/G
CREAM TOPICAL 2 TIMES DAILY PRN
Qty: 60 G | Refills: 0 | Status: SHIPPED | OUTPATIENT
Start: 2025-07-07

## 2025-07-07 SDOH — HEALTH STABILITY - MENTAL HEALTH: OTHER PHYSICAL AND MENTAL STRAIN RELATED TO WORK: Z56.6

## 2025-07-07 NOTE — ASSESSMENT & PLAN NOTE
She feels her dermatitis is associated with stress.  Will prescribe clobetasol cream as well as as needed hydroxyzine.  Continue to monitor clinically.  Orders:  •  hydrOXYzine HCL (ATARAX) 25 mg tablet; Take 1 tablet (25 mg total) by mouth every 6 (six) hours as needed for itching, allergies or anxiety  •  clobetasol (TEMOVATE) 0.05 % cream; Apply topically 2 (two) times a day as needed (rash)

## 2025-07-07 NOTE — ASSESSMENT & PLAN NOTE
Discussed diet and exercise.   Continue follow up with GYN.   Discussed skin cancer screening. Discussed use of sun block/screen.   Labs ordered for screening.     Orders:  •  CBC; Future  •  Comprehensive metabolic panel; Future  •  Lipid panel; Future

## 2025-07-07 NOTE — ASSESSMENT & PLAN NOTE
Will prescribe as needed hydroxyzine for increased irritability, stress, panic, or anxiousness.  Orders:  •  hydrOXYzine HCL (ATARAX) 25 mg tablet; Take 1 tablet (25 mg total) by mouth every 6 (six) hours as needed for itching, allergies or anxiety

## 2025-07-07 NOTE — PROGRESS NOTES
Adult Annual Physical  Name: Teena Lundy      : 1995      MRN: [id number]  Encounter Provider: Bebo Grey MD  Encounter Date: 2025   Encounter department: [facility]    :  Assessment & Plan  Dermatitis  She feels her dermatitis is associated with stress.  Will prescribe clobetasol cream as well as as needed hydroxyzine.  Continue to monitor clinically.  Orders:  •  hydrOXYzine HCL (ATARAX) 25 mg tablet; Take 1 tablet (25 mg total) by mouth every 6 (six) hours as needed for itching, allergies or anxiety  •  clobetasol (TEMOVATE) 0.05 % cream; Apply topically 2 (two) times a day as needed (rash)    Stress at work  Will prescribe as needed hydroxyzine for increased irritability, stress, panic, or anxiousness.  Orders:  •  hydrOXYzine HCL (ATARAX) 25 mg tablet; Take 1 tablet (25 mg total) by mouth every 6 (six) hours as needed for itching, allergies or anxiety    Annual physical exam  Discussed diet and exercise.   Continue follow up with GYN.   Discussed skin cancer screening. Discussed use of sun block/screen.   Labs ordered for screening.     Orders:  •  CBC; Future  •  Comprehensive metabolic panel; Future  •  Lipid panel; Future    Encounter for lipid screening for cardiovascular disease    Orders:  •  CBC; Future  •  Comprehensive metabolic panel; Future  •  Lipid panel; Future    Diabetes mellitus screening    Orders:  •  CBC; Future  •  Comprehensive metabolic panel; Future  •  Lipid panel; Future  •  Hemoglobin A1C; Future        Preventive Screenings:  - Diabetes Screening: risks/benefits discussed, orders placed and screening up-to-date  - Cholesterol Screening: risks/benefits discussed, orders placed and screening up-to-date   - Hepatitis C screening: screening up-to-date and risks/benefits discussed   - HIV screening: screening up-to-date and risks/benefits discussed   - Cervical cancer screening: risks/benefits discussed and orders placed   -  Colon cancer screening: risks/benefits discussed and screening not indicated   - Lung cancer screening: screening not indicated     Immunizations:  - Immunizations due: Tdap    Counseling/Anticipatory Guidance:  - Alcohol: discussed moderation in alcohol intake and recommendations for healthy alcohol use.   - Tobacco use: discussed harms of tobacco use and management options for quitting.   - Dental health: discussed importance of regular tooth brushing, flossing, and dental visits.   - Sexual health: discussed sexually transmitted diseases, partner selection, use of condoms, avoidance of unintended pregnancy, and contraceptive alternatives.   - Diet: discussed recommendations for a healthy/well-balanced diet.   - Exercise: the importance of regular exercise/physical activity was discussed. Recommend exercise 3-5 times per week for at least 30 minutes.   - Injury prevention: discussed safety/seat belts, safety helmets, smoke detectors, carbon monoxide detectors, and smoking near bedding or upholstery.          History of Present Illness     Adult Annual Physical:  Patient presents for annual physical. Teena Lundy is seen today for annual physical examination.   She has been experiencing recurrent dermatitis that she notices is exacerbated with stress.  She has been having stress during work.  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  Dermatitis is localized to bilateral hands, arms, and chest.  She denies any recent allergens.  She has no other complaints or concerns at this time..     Diet and Physical Activity:  - Diet/Nutrition: no special diet.  - Exercise: no formal exercise.    General Health:  - Sleep: 4-6 hours of sleep on average. night shift work  - Hearing: normal hearing bilateral ears.  - Vision: most recent eye exam > 1 year ago and wears glasses.  - Dental: no dental visits for > 1 year and brushes teeth twice daily.    /GYN Health:  - Follows with GYN: yes.   - Menopause:  "premenopausal.   - History of STDs: no  - Contraception: barrier methods.      Advanced Care Planning:  - Has an advanced directive?: no    - Has a durable medical POA?: no      Review of Systems   Constitutional:  Negative for activity change, appetite change, chills, diaphoresis, fatigue and fever.   HENT:  Negative for congestion, postnasal drip, rhinorrhea, sinus pressure, sinus pain, sneezing and sore throat.    Eyes:  Negative for visual disturbance.   Respiratory:  Negative for apnea, cough, choking, chest tightness, shortness of breath and wheezing.    Cardiovascular:  Negative for chest pain, palpitations and leg swelling.   Gastrointestinal:  Negative for abdominal distention, abdominal pain, anal bleeding, blood in stool, constipation, diarrhea, nausea and vomiting.   Endocrine: Negative for cold intolerance and heat intolerance.   Genitourinary:  Negative for difficulty urinating, dysuria and hematuria.   Musculoskeletal: Negative.    Skin: Negative.    Neurological:  Negative for dizziness, weakness, light-headedness, numbness and headaches.   Hematological:  Negative for adenopathy.   Psychiatric/Behavioral:  Negative for agitation, sleep disturbance and suicidal ideas.    All other systems reviewed and are negative.        Objective   /76 (BP Location: Left arm, Patient Position: Sitting, Cuff Size: Large)   Pulse 85   Temp 98 °F (36.7 °C) (Temporal)   Ht 5' 5.55\" (1.665 m)   Wt 110 kg (243 lb)   SpO2 97%   BMI 39.76 kg/m²     Physical Exam  Vitals and nursing note reviewed.   Constitutional:       General: She is not in acute distress.     Appearance: She is well-developed. She is not diaphoretic.   HENT:      Head: Normocephalic and atraumatic.     Eyes:      General:         Right eye: No discharge.         Left eye: No discharge.      Conjunctiva/sclera: Conjunctivae normal.      Pupils: Pupils are equal, round, and reactive to light.     Neck:      Thyroid: No thyromegaly.      " Vascular: No JVD.     Cardiovascular:      Rate and Rhythm: Normal rate and regular rhythm.      Heart sounds: Normal heart sounds. No murmur heard.     No friction rub. No gallop.   Pulmonary:      Effort: Pulmonary effort is normal. No respiratory distress.      Breath sounds: Normal breath sounds. No wheezing or rales.   Chest:      Chest wall: No tenderness.   Abdominal:      General: There is no distension.      Palpations: Abdomen is soft.      Tenderness: There is no abdominal tenderness.     Musculoskeletal:         General: No tenderness or deformity. Normal range of motion.      Cervical back: Normal range of motion and neck supple.   Lymphadenopathy:      Cervical: No cervical adenopathy.     Skin:     General: Skin is warm and dry.      Coloration: Skin is not pale.      Findings: No erythema or rash.          Neurological:      Mental Status: She is alert and oriented to person, place, and time.      Cranial Nerves: No cranial nerve deficit.      Coordination: Coordination normal.     Psychiatric:         Behavior: Behavior normal.         Thought Content: Thought content normal.         Judgment: Judgment normal.

## 2025-07-10 ENCOUNTER — APPOINTMENT (OUTPATIENT)
Dept: LAB | Facility: IMAGING CENTER | Age: 30
End: 2025-07-10
Payer: COMMERCIAL

## 2025-07-10 DIAGNOSIS — Z13.6 ENCOUNTER FOR LIPID SCREENING FOR CARDIOVASCULAR DISEASE: ICD-10-CM

## 2025-07-10 DIAGNOSIS — Z13.1 DIABETES MELLITUS SCREENING: ICD-10-CM

## 2025-07-10 DIAGNOSIS — Z00.00 ANNUAL PHYSICAL EXAM: ICD-10-CM

## 2025-07-10 DIAGNOSIS — Z13.220 ENCOUNTER FOR LIPID SCREENING FOR CARDIOVASCULAR DISEASE: ICD-10-CM

## 2025-07-10 LAB
ALBUMIN SERPL BCG-MCNC: 4.3 G/DL (ref 3.5–5)
ALP SERPL-CCNC: 48 U/L (ref 34–104)
ALT SERPL W P-5'-P-CCNC: 21 U/L (ref 7–52)
ANION GAP SERPL CALCULATED.3IONS-SCNC: 9 MMOL/L (ref 4–13)
AST SERPL W P-5'-P-CCNC: 20 U/L (ref 13–39)
BILIRUB SERPL-MCNC: 0.48 MG/DL (ref 0.2–1)
BUN SERPL-MCNC: 8 MG/DL (ref 5–25)
CALCIUM SERPL-MCNC: 9.1 MG/DL (ref 8.4–10.2)
CHLORIDE SERPL-SCNC: 107 MMOL/L (ref 96–108)
CHOLEST SERPL-MCNC: 190 MG/DL (ref ?–200)
CO2 SERPL-SCNC: 24 MMOL/L (ref 21–32)
CREAT SERPL-MCNC: 0.76 MG/DL (ref 0.6–1.3)
ERYTHROCYTE [DISTWIDTH] IN BLOOD BY AUTOMATED COUNT: 12.1 % (ref 11.6–15.1)
EST. AVERAGE GLUCOSE BLD GHB EST-MCNC: 100 MG/DL
GFR SERPL CREATININE-BSD FRML MDRD: 104 ML/MIN/1.73SQ M
GLUCOSE P FAST SERPL-MCNC: 93 MG/DL (ref 65–99)
HBA1C MFR BLD: 5.1 %
HCT VFR BLD AUTO: 40.6 % (ref 34.8–46.1)
HDLC SERPL-MCNC: 51 MG/DL
HGB BLD-MCNC: 13.6 G/DL (ref 11.5–15.4)
LDLC SERPL CALC-MCNC: 117 MG/DL (ref 0–100)
MCH RBC QN AUTO: 30.2 PG (ref 26.8–34.3)
MCHC RBC AUTO-ENTMCNC: 33.5 G/DL (ref 31.4–37.4)
MCV RBC AUTO: 90 FL (ref 82–98)
NONHDLC SERPL-MCNC: 139 MG/DL
PLATELET # BLD AUTO: 229 THOUSANDS/UL (ref 149–390)
PMV BLD AUTO: 12.4 FL (ref 8.9–12.7)
POTASSIUM SERPL-SCNC: 4 MMOL/L (ref 3.5–5.3)
PROT SERPL-MCNC: 6.9 G/DL (ref 6.4–8.4)
RBC # BLD AUTO: 4.5 MILLION/UL (ref 3.81–5.12)
SODIUM SERPL-SCNC: 140 MMOL/L (ref 135–147)
TRIGL SERPL-MCNC: 111 MG/DL (ref ?–150)
WBC # BLD AUTO: 8.44 THOUSAND/UL (ref 4.31–10.16)

## 2025-07-10 PROCEDURE — 36415 COLL VENOUS BLD VENIPUNCTURE: CPT

## 2025-07-10 PROCEDURE — 80061 LIPID PANEL: CPT

## 2025-07-10 PROCEDURE — 85027 COMPLETE CBC AUTOMATED: CPT

## 2025-07-10 PROCEDURE — 80053 COMPREHEN METABOLIC PANEL: CPT

## 2025-07-10 PROCEDURE — 83036 HEMOGLOBIN GLYCOSYLATED A1C: CPT
